# Patient Record
Sex: FEMALE | URBAN - METROPOLITAN AREA
[De-identification: names, ages, dates, MRNs, and addresses within clinical notes are randomized per-mention and may not be internally consistent; named-entity substitution may affect disease eponyms.]

---

## 2018-11-15 ENCOUNTER — APPOINTMENT (RX ONLY)
Dept: URBAN - METROPOLITAN AREA CLINIC 26 | Facility: CLINIC | Age: 53
Setting detail: DERMATOLOGY
End: 2018-11-15

## 2018-11-15 DIAGNOSIS — L81.4 OTHER MELANIN HYPERPIGMENTATION: ICD-10-CM

## 2018-11-15 DIAGNOSIS — L91.8 OTHER HYPERTROPHIC DISORDERS OF THE SKIN: ICD-10-CM

## 2018-11-15 DIAGNOSIS — D18.0 HEMANGIOMA: ICD-10-CM

## 2018-11-15 DIAGNOSIS — D22 MELANOCYTIC NEVI: ICD-10-CM

## 2018-11-15 PROBLEM — D18.01 HEMANGIOMA OF SKIN AND SUBCUTANEOUS TISSUE: Status: ACTIVE | Noted: 2018-11-15

## 2018-11-15 PROBLEM — D22.62 MELANOCYTIC NEVI OF LEFT UPPER LIMB, INCLUDING SHOULDER: Status: ACTIVE | Noted: 2018-11-15

## 2018-11-15 PROBLEM — D22.61 MELANOCYTIC NEVI OF RIGHT UPPER LIMB, INCLUDING SHOULDER: Status: ACTIVE | Noted: 2018-11-15

## 2018-11-15 PROCEDURE — 17110 DESTRUCTION B9 LES UP TO 14: CPT

## 2018-11-15 PROCEDURE — ? BENIGN DESTRUCTION

## 2018-11-15 PROCEDURE — ? SUNSCREEN RECOMMENDATIONS

## 2018-11-15 PROCEDURE — 99214 OFFICE O/P EST MOD 30 MIN: CPT | Mod: 25

## 2018-11-15 PROCEDURE — ? COUNSELING

## 2018-11-15 ASSESSMENT — LOCATION DETAILED DESCRIPTION DERM
LOCATION DETAILED: LEFT PROXIMAL POSTERIOR UPPER ARM
LOCATION DETAILED: RIGHT MID TEMPLE
LOCATION DETAILED: RIGHT AXILLARY VAULT
LOCATION DETAILED: RIGHT CENTRAL FRONTAL SCALP
LOCATION DETAILED: RIGHT PROXIMAL LATERAL POSTERIOR UPPER ARM
LOCATION DETAILED: LEFT POSTERIOR SHOULDER
LOCATION DETAILED: RIGHT POSTERIOR SHOULDER

## 2018-11-15 ASSESSMENT — LOCATION SIMPLE DESCRIPTION DERM
LOCATION SIMPLE: RIGHT AXILLARY VAULT
LOCATION SIMPLE: LEFT SHOULDER
LOCATION SIMPLE: RIGHT TEMPLE
LOCATION SIMPLE: RIGHT POSTERIOR UPPER ARM
LOCATION SIMPLE: LEFT POSTERIOR UPPER ARM
LOCATION SIMPLE: RIGHT SCALP
LOCATION SIMPLE: RIGHT SHOULDER

## 2018-11-15 ASSESSMENT — LOCATION ZONE DERM
LOCATION ZONE: SCALP
LOCATION ZONE: AXILLAE
LOCATION ZONE: FACE
LOCATION ZONE: ARM

## 2018-11-15 NOTE — PROCEDURE: BENIGN DESTRUCTION
Bill Insurance (You Assume Risk Of Denial Or Audit By Selecting Yes): Yes
Detail Level: Detailed
Consent: The patient's consent was obtained including but not limited to risks of crusting, scabbing, blistering, scarring, darker or lighter pigmentary change, recurrence, incomplete removal and infection.
Anesthesia Volume In Cc: 0.5
Post-Care Instructions: I reviewed with the patient in detail post-care instructions. Patient is to wear sunprotection, and avoid picking at any of the treated lesions. Pt may apply Vaseline to crusted or scabbing areas.
Medical Necessity Clause: This procedure was medically necessary because the lesions that were treated were:
Medical Necessity Information: It is in your best interest to select a reason for this procedure from the list below. All of these items fulfill various CMS LCD requirements except the new and changing color options.
Add 52 Modifier (Optional): no

## 2019-11-18 ENCOUNTER — APPOINTMENT (RX ONLY)
Dept: URBAN - METROPOLITAN AREA CLINIC 31 | Facility: CLINIC | Age: 54
Setting detail: DERMATOLOGY
End: 2019-11-18

## 2019-11-18 DIAGNOSIS — D22 MELANOCYTIC NEVI: ICD-10-CM

## 2019-11-18 DIAGNOSIS — Z00.00 ENCOUNTER FOR GENERAL ADULT MEDICAL EXAMINATION WITHOUT ABNORMAL FINDINGS: ICD-10-CM

## 2019-11-18 DIAGNOSIS — D18.0 HEMANGIOMA: ICD-10-CM

## 2019-11-18 DIAGNOSIS — L81.4 OTHER MELANIN HYPERPIGMENTATION: ICD-10-CM

## 2019-11-18 PROBLEM — Z71.1 PERSON WITH FEARED HEALTH COMPLAINT IN WHOM NO DIAGNOSIS IS MADE: Status: ACTIVE | Noted: 2019-11-18

## 2019-11-18 PROBLEM — D18.01 HEMANGIOMA OF SKIN AND SUBCUTANEOUS TISSUE: Status: ACTIVE | Noted: 2019-11-18

## 2019-11-18 PROBLEM — D22.5 MELANOCYTIC NEVI OF TRUNK: Status: ACTIVE | Noted: 2019-11-18

## 2019-11-18 PROCEDURE — ? FULL BODY SKIN EXAM

## 2019-11-18 PROCEDURE — 99213 OFFICE O/P EST LOW 20 MIN: CPT

## 2019-11-18 PROCEDURE — ? SUNSCREEN RECOMMENDATIONS

## 2019-11-18 PROCEDURE — ? COUNSELING

## 2019-11-18 ASSESSMENT — LOCATION ZONE DERM
LOCATION ZONE: TRUNK
LOCATION ZONE: LEG
LOCATION ZONE: ARM

## 2019-11-18 ASSESSMENT — LOCATION SIMPLE DESCRIPTION DERM
LOCATION SIMPLE: RIGHT SHOULDER
LOCATION SIMPLE: LEFT UPPER BACK
LOCATION SIMPLE: RIGHT THIGH
LOCATION SIMPLE: UPPER BACK
LOCATION SIMPLE: LEFT SHOULDER

## 2019-11-18 ASSESSMENT — LOCATION DETAILED DESCRIPTION DERM
LOCATION DETAILED: RIGHT POSTERIOR SHOULDER
LOCATION DETAILED: SUPERIOR THORACIC SPINE
LOCATION DETAILED: LEFT SUPERIOR UPPER BACK
LOCATION DETAILED: RIGHT ANTERIOR PROXIMAL THIGH
LOCATION DETAILED: LEFT POSTERIOR SHOULDER

## 2021-04-20 ENCOUNTER — APPOINTMENT (RX ONLY)
Dept: URBAN - METROPOLITAN AREA CLINIC 26 | Facility: CLINIC | Age: 56
Setting detail: DERMATOLOGY
End: 2021-04-20

## 2021-04-20 DIAGNOSIS — D18.0 HEMANGIOMA: ICD-10-CM

## 2021-04-20 DIAGNOSIS — D22 MELANOCYTIC NEVI: ICD-10-CM

## 2021-04-20 DIAGNOSIS — L81.4 OTHER MELANIN HYPERPIGMENTATION: ICD-10-CM

## 2021-04-20 PROBLEM — D18.01 HEMANGIOMA OF SKIN AND SUBCUTANEOUS TISSUE: Status: ACTIVE | Noted: 2021-04-20

## 2021-04-20 PROBLEM — D22.72 MELANOCYTIC NEVI OF LEFT LOWER LIMB, INCLUDING HIP: Status: ACTIVE | Noted: 2021-04-20

## 2021-04-20 PROBLEM — D22.61 MELANOCYTIC NEVI OF RIGHT UPPER LIMB, INCLUDING SHOULDER: Status: ACTIVE | Noted: 2021-04-20

## 2021-04-20 PROCEDURE — ? OBSERVATION AND MEASURE

## 2021-04-20 PROCEDURE — ? FULL BODY SKIN EXAM

## 2021-04-20 PROCEDURE — 99213 OFFICE O/P EST LOW 20 MIN: CPT

## 2021-04-20 PROCEDURE — ? COUNSELING

## 2021-04-20 PROCEDURE — ? ADDITIONAL NOTES

## 2021-04-20 PROCEDURE — ? TREATMENT REGIMEN

## 2021-04-20 ASSESSMENT — LOCATION SIMPLE DESCRIPTION DERM
LOCATION SIMPLE: LEFT PLANTAR SURFACE
LOCATION SIMPLE: ABDOMEN
LOCATION SIMPLE: UPPER BACK
LOCATION SIMPLE: RIGHT SHOULDER

## 2021-04-20 ASSESSMENT — LOCATION DETAILED DESCRIPTION DERM
LOCATION DETAILED: INFERIOR THORACIC SPINE
LOCATION DETAILED: PERIUMBILICAL SKIN
LOCATION DETAILED: RIGHT POSTERIOR SHOULDER
LOCATION DETAILED: LEFT MEDIAL PLANTAR MIDFOOT

## 2021-04-20 ASSESSMENT — LOCATION ZONE DERM
LOCATION ZONE: FEET
LOCATION ZONE: ARM
LOCATION ZONE: TRUNK

## 2022-05-11 ENCOUNTER — APPOINTMENT (RX ONLY)
Dept: URBAN - METROPOLITAN AREA CLINIC 31 | Facility: CLINIC | Age: 57
Setting detail: DERMATOLOGY
End: 2022-05-11

## 2022-05-11 DIAGNOSIS — D22 MELANOCYTIC NEVI: ICD-10-CM

## 2022-05-11 DIAGNOSIS — D18.0 HEMANGIOMA: ICD-10-CM

## 2022-05-11 DIAGNOSIS — L81.4 OTHER MELANIN HYPERPIGMENTATION: ICD-10-CM

## 2022-05-11 PROBLEM — D22.72 MELANOCYTIC NEVI OF LEFT LOWER LIMB, INCLUDING HIP: Status: ACTIVE | Noted: 2022-05-11

## 2022-05-11 PROBLEM — D18.01 HEMANGIOMA OF SKIN AND SUBCUTANEOUS TISSUE: Status: ACTIVE | Noted: 2022-05-11

## 2022-05-11 PROBLEM — D22.5 MELANOCYTIC NEVI OF TRUNK: Status: ACTIVE | Noted: 2022-05-11

## 2022-05-11 PROCEDURE — ? OBSERVATION AND MEASURE

## 2022-05-11 PROCEDURE — ? COUNSELING

## 2022-05-11 PROCEDURE — ? FULL BODY SKIN EXAM

## 2022-05-11 PROCEDURE — 99213 OFFICE O/P EST LOW 20 MIN: CPT

## 2022-05-11 ASSESSMENT — LOCATION ZONE DERM
LOCATION ZONE: FEET
LOCATION ZONE: TRUNK

## 2022-05-11 ASSESSMENT — LOCATION DETAILED DESCRIPTION DERM
LOCATION DETAILED: SUPERIOR THORACIC SPINE
LOCATION DETAILED: LEFT MEDIAL PLANTAR MIDFOOT

## 2022-05-11 ASSESSMENT — LOCATION SIMPLE DESCRIPTION DERM
LOCATION SIMPLE: UPPER BACK
LOCATION SIMPLE: LEFT PLANTAR SURFACE

## 2023-05-17 ENCOUNTER — APPOINTMENT (RX ONLY)
Dept: URBAN - METROPOLITAN AREA CLINIC 31 | Facility: CLINIC | Age: 58
Setting detail: DERMATOLOGY
End: 2023-05-17

## 2023-05-17 DIAGNOSIS — L81.4 OTHER MELANIN HYPERPIGMENTATION: ICD-10-CM

## 2023-05-17 DIAGNOSIS — D22 MELANOCYTIC NEVI: ICD-10-CM | Status: STABLE

## 2023-05-17 DIAGNOSIS — D18.0 HEMANGIOMA: ICD-10-CM

## 2023-05-17 DIAGNOSIS — L82.1 OTHER SEBORRHEIC KERATOSIS: ICD-10-CM

## 2023-05-17 DIAGNOSIS — D22 MELANOCYTIC NEVI: ICD-10-CM

## 2023-05-17 PROBLEM — D18.01 HEMANGIOMA OF SKIN AND SUBCUTANEOUS TISSUE: Status: ACTIVE | Noted: 2023-05-17

## 2023-05-17 PROBLEM — D22.5 MELANOCYTIC NEVI OF TRUNK: Status: ACTIVE | Noted: 2023-05-17

## 2023-05-17 PROBLEM — D22.72 MELANOCYTIC NEVI OF LEFT LOWER LIMB, INCLUDING HIP: Status: ACTIVE | Noted: 2023-05-17

## 2023-05-17 PROCEDURE — 99213 OFFICE O/P EST LOW 20 MIN: CPT

## 2023-05-17 PROCEDURE — ? COUNSELING

## 2023-05-17 PROCEDURE — ? FULL BODY SKIN EXAM

## 2023-05-17 PROCEDURE — ? OBSERVATION AND MEASURE

## 2023-05-17 PROCEDURE — ? TREATMENT REGIMEN

## 2023-05-17 ASSESSMENT — LOCATION DETAILED DESCRIPTION DERM
LOCATION DETAILED: INFERIOR THORACIC SPINE
LOCATION DETAILED: SUPERIOR THORACIC SPINE
LOCATION DETAILED: LEFT MEDIAL PLANTAR MIDFOOT

## 2023-05-17 ASSESSMENT — LOCATION ZONE DERM
LOCATION ZONE: TRUNK
LOCATION ZONE: FEET

## 2023-05-17 ASSESSMENT — LOCATION SIMPLE DESCRIPTION DERM
LOCATION SIMPLE: UPPER BACK
LOCATION SIMPLE: LEFT PLANTAR SURFACE

## 2023-10-17 ENCOUNTER — APPOINTMENT (EMERGENCY)
Dept: RADIOLOGY | Facility: HOSPITAL | Age: 58
End: 2023-10-17
Payer: COMMERCIAL

## 2023-10-17 ENCOUNTER — HOSPITAL ENCOUNTER (EMERGENCY)
Facility: HOSPITAL | Age: 58
Discharge: HOME | End: 2023-10-17
Attending: EMERGENCY MEDICINE
Payer: COMMERCIAL

## 2023-10-17 VITALS
DIASTOLIC BLOOD PRESSURE: 79 MMHG | TEMPERATURE: 98 F | OXYGEN SATURATION: 98 % | SYSTOLIC BLOOD PRESSURE: 135 MMHG | HEART RATE: 68 BPM | RESPIRATION RATE: 16 BRPM

## 2023-10-17 DIAGNOSIS — R00.2 PALPITATIONS: ICD-10-CM

## 2023-10-17 DIAGNOSIS — R07.9 ACUTE CHEST PAIN: Primary | ICD-10-CM

## 2023-10-17 LAB
ALBUMIN SERPL-MCNC: 4.7 G/DL (ref 3.5–5.7)
ALP SERPL-CCNC: 97 IU/L (ref 34–125)
ALT SERPL-CCNC: 30 IU/L (ref 7–52)
ANION GAP SERPL CALC-SCNC: 6 MEQ/L (ref 3–15)
AST SERPL-CCNC: 27 IU/L (ref 13–39)
BASOPHILS # BLD: 0.05 K/UL (ref 0.01–0.1)
BASOPHILS NFR BLD: 0.6 %
BILIRUB SERPL-MCNC: 0.5 MG/DL (ref 0.3–1.2)
BUN SERPL-MCNC: 10 MG/DL (ref 7–25)
CALCIUM SERPL-MCNC: 10.1 MG/DL (ref 8.6–10.3)
CHLORIDE SERPL-SCNC: 103 MEQ/L (ref 98–107)
CO2 SERPL-SCNC: 29 MEQ/L (ref 21–31)
CREAT SERPL-MCNC: 0.6 MG/DL (ref 0.6–1.2)
D DIMER PPP IA.FEU-MCNC: 0.39 UG/ML FEU (ref 0–0.5)
DIFFERENTIAL METHOD BLD: ABNORMAL
EOSINOPHIL # BLD: 0.08 K/UL (ref 0.04–0.36)
EOSINOPHIL NFR BLD: 1 %
ERYTHROCYTE [DISTWIDTH] IN BLOOD BY AUTOMATED COUNT: 12.8 % (ref 11.7–14.4)
GFR SERPL CREATININE-BSD FRML MDRD: >60 ML/MIN/1.73M*2
GLUCOSE SERPL-MCNC: 102 MG/DL (ref 70–99)
HCT VFR BLDCO AUTO: 41.6 % (ref 35–45)
HGB BLD-MCNC: 13.8 G/DL (ref 11.8–15.7)
IMM GRANULOCYTES # BLD AUTO: 0.04 K/UL (ref 0–0.08)
IMM GRANULOCYTES NFR BLD AUTO: 0.5 %
LYMPHOCYTES # BLD: 1.69 K/UL (ref 1.2–3.5)
LYMPHOCYTES NFR BLD: 20.7 %
MAGNESIUM SERPL-MCNC: 2.2 MG/DL (ref 1.8–2.5)
MCH RBC QN AUTO: 30.9 PG (ref 28–33.2)
MCHC RBC AUTO-ENTMCNC: 33.2 G/DL (ref 32.2–35.5)
MCV RBC AUTO: 93.3 FL (ref 83–98)
MONOCYTES # BLD: 0.6 K/UL (ref 0.28–0.8)
MONOCYTES NFR BLD: 7.4 %
NEUTROPHILS # BLD: 5.69 K/UL (ref 1.7–7)
NEUTS SEG NFR BLD: 69.8 %
NRBC BLD-RTO: 0 %
PDW BLD AUTO: 8.8 FL (ref 9.4–12.3)
PLATELET # BLD AUTO: 278 K/UL (ref 150–369)
POTASSIUM SERPL-SCNC: 4.7 MEQ/L (ref 3.5–5.1)
PROT SERPL-MCNC: 8 G/DL (ref 6–8.2)
RBC # BLD AUTO: 4.46 M/UL (ref 3.93–5.22)
SODIUM SERPL-SCNC: 138 MEQ/L (ref 136–145)
TROPONIN I SERPL HS-MCNC: <2 PG/ML
TROPONIN I SERPL HS-MCNC: <2 PG/ML
TSH SERPL DL<=0.05 MIU/L-ACNC: 2.45 MIU/L (ref 0.34–5.6)
WBC # BLD AUTO: 8.15 K/UL (ref 3.8–10.5)

## 2023-10-17 PROCEDURE — 96361 HYDRATE IV INFUSION ADD-ON: CPT

## 2023-10-17 PROCEDURE — 99284 EMERGENCY DEPT VISIT MOD MDM: CPT | Mod: 25

## 2023-10-17 PROCEDURE — 3E0337Z INTRODUCTION OF ELECTROLYTIC AND WATER BALANCE SUBSTANCE INTO PERIPHERAL VEIN, PERCUTANEOUS APPROACH: ICD-10-PCS | Performed by: EMERGENCY MEDICINE

## 2023-10-17 PROCEDURE — 93005 ELECTROCARDIOGRAM TRACING: CPT

## 2023-10-17 PROCEDURE — 96360 HYDRATION IV INFUSION INIT: CPT

## 2023-10-17 PROCEDURE — 80053 COMPREHEN METABOLIC PANEL: CPT | Performed by: EMERGENCY MEDICINE

## 2023-10-17 PROCEDURE — 25800000 HC PHARMACY IV SOLUTIONS: Performed by: PHYSICIAN ASSISTANT

## 2023-10-17 PROCEDURE — 85379 FIBRIN DEGRADATION QUANT: CPT | Performed by: PHYSICIAN ASSISTANT

## 2023-10-17 PROCEDURE — 84484 ASSAY OF TROPONIN QUANT: CPT | Mod: 91 | Performed by: EMERGENCY MEDICINE

## 2023-10-17 PROCEDURE — 83735 ASSAY OF MAGNESIUM: CPT | Performed by: PHYSICIAN ASSISTANT

## 2023-10-17 PROCEDURE — 84484 ASSAY OF TROPONIN QUANT: CPT

## 2023-10-17 PROCEDURE — 36415 COLL VENOUS BLD VENIPUNCTURE: CPT

## 2023-10-17 PROCEDURE — 85025 COMPLETE CBC W/AUTO DIFF WBC: CPT

## 2023-10-17 PROCEDURE — 93005 ELECTROCARDIOGRAM TRACING: CPT | Performed by: EMERGENCY MEDICINE

## 2023-10-17 PROCEDURE — 84484 ASSAY OF TROPONIN QUANT: CPT | Performed by: EMERGENCY MEDICINE

## 2023-10-17 PROCEDURE — 84443 ASSAY THYROID STIM HORMONE: CPT | Performed by: PHYSICIAN ASSISTANT

## 2023-10-17 PROCEDURE — 80053 COMPREHEN METABOLIC PANEL: CPT

## 2023-10-17 PROCEDURE — 85025 COMPLETE CBC W/AUTO DIFF WBC: CPT | Performed by: EMERGENCY MEDICINE

## 2023-10-17 PROCEDURE — 71046 X-RAY EXAM CHEST 2 VIEWS: CPT

## 2023-10-17 RX ORDER — TRAZODONE HYDROCHLORIDE 50 MG/1
50 TABLET ORAL
COMMUNITY
Start: 2023-10-02 | End: 2024-01-15 | Stop reason: SDUPTHER

## 2023-10-17 RX ADMIN — SODIUM CHLORIDE 500 ML: 9 INJECTION, SOLUTION INTRAVENOUS at 17:22

## 2023-10-17 ASSESSMENT — ENCOUNTER SYMPTOMS
NAUSEA: 0
HEMATURIA: 0
BLOOD IN STOOL: 0
DIZZINESS: 0
SHORTNESS OF BREATH: 0
ARTHRALGIAS: 1
BACK PAIN: 0
APPETITE CHANGE: 0
SORE THROAT: 0
VOMITING: 0
FEVER: 0
PALPITATIONS: 1
DIARRHEA: 0
CHILLS: 0
WEAKNESS: 0
COUGH: 0
NUMBNESS: 0
HEADACHES: 0
ABDOMINAL PAIN: 0

## 2023-10-17 NOTE — ED PROVIDER NOTES
Emergency Medicine Note  HPI   HISTORY OF PRESENT ILLNESS     57-year-old female with history of insomnia presents to the ED for evaluation of chest pain and palpitations.  Patient reports 3 days ago at night she developed sternal chest discomfort sharp in nature was 4-10 in severity with pounding palpitations that lasted for 1 to 2 hours.  During that time span she used her 's cardia program and her heart rate was in the low 100s sinus tachycardia.  She reports feeling fine over the last couple days, was able to workout without difficulties on a treadmill.  Around 12 PM this afternoon she started with pounding palpitations again as well as the sternal chest discomfort that then radiated to her left shoulder.  She reports symptoms were intermittent for about 2 hours and last occurred at 2 PM.  She reports during that time when she was belching it would help with symptoms.  She went to an urgent care and was sent here for further evaluation.  She last flew to [place] 2 weeks ago.  She is in a first appointment with a cardiologist in December Dr. Oneill.  She has never seen a cardiologist previously.  No personal history of CAD or family history of CAD less than 65 years old.  Denies tobacco use, drug use, leg swelling or calf pain, hemoptysis, birth control pill use, history of blood clots, nausea, vomiting, diarrhea, black or bloody bowel movements, cough, fever, history of arrhythmias, history of thyroid problems.  She reports having 2 glasses of wine almost nightly.  Denies increase in caffeine intake or recent over-the-counter decongestant use.  States she has been eating and drinking normally.  Does not have any symptoms currently in ER. She is an RN.      History provided by:  Patient        Patient History   PAST HISTORY     Reviewed from Nursing Triage:       History reviewed. No pertinent past medical history.    No past surgical history on file.    History reviewed. No pertinent family history.            Review of Systems   REVIEW OF SYSTEMS     Review of Systems   Constitutional: Negative for appetite change, chills, diaphoresis and fever.   HENT: Negative for sore throat.    Eyes: Negative for visual disturbance.   Respiratory: Negative for cough and shortness of breath.    Cardiovascular: Positive for chest pain and palpitations. Negative for leg swelling.   Gastrointestinal: Negative for abdominal pain, blood in stool, diarrhea, nausea and vomiting.   Genitourinary: Negative for hematuria.   Musculoskeletal: Positive for arthralgias (left shoulder). Negative for back pain.   Skin: Negative for rash.   Neurological: Negative for dizziness, syncope, weakness, numbness and headaches.         VITALS     ED Vitals    Date/Time Temp Pulse Resp BP SpO2 [facility]   10/17/23 1907 36.7 °C (98 °F) 68 16 135/79 98 % GT   10/17/23 1643 -- 67 16 158/72 98 % GT   10/17/23 1526 36.7 °C (98 °F) 78 18 157/72 98 % GC        Pulse Ox %: 98 % (10/17/23 1643)  Pulse Ox Interpretation: Normal (10/17/23 1643)  Heart Rate: 67 (10/17/23 1643)  Rhythm Strip Interpretation: Normal Sinus Rhythm (10/17/23 1643)     Physical Exam   PHYSICAL EXAM     Physical Exam  Vitals and nursing note reviewed.   Constitutional:       General: She is not in acute distress.     Appearance: She is not ill-appearing.   HENT:      Head: Normocephalic.      Mouth/Throat:      Mouth: Mucous membranes are moist.   Eyes:      Conjunctiva/sclera: Conjunctivae normal.   Cardiovascular:      Rate and Rhythm: Normal rate and regular rhythm.      Pulses:           Radial pulses are 2+ on the right side and 2+ on the left side.        Posterior tibial pulses are 2+ on the right side and 2+ on the left side.      Heart sounds: No murmur heard.  Pulmonary:      Effort: Pulmonary effort is normal. No respiratory distress.      Breath sounds: Normal breath sounds.   Chest:      Chest wall: No tenderness.   Abdominal:      Palpations: Abdomen is soft.      Tenderness: There  is no abdominal tenderness. There is no guarding or rebound.   Musculoskeletal:      Cervical back: Neck supple.      Right lower leg: No edema.      Left lower leg: No edema.      Comments: No calf TTP BL   Skin:     General: Skin is warm and dry.   Neurological:      Mental Status: She is alert.   Psychiatric:         Mood and Affect: Mood normal.           PROCEDURES     Procedures     DATA     Results     Procedure Component Value Units Date/Time    Magnesium [[id number]]  (Normal) Collected: 10/17/23 1529    Specimen: Blood, Venous Updated: 10/17/23 1818     Magnesium 2.2 mg/dL      Comment: Moderate hemolysis, results may be affected.        TSH w reflex FT4 [[id number]]  (Normal) Collected: 10/17/23 1529    Specimen: Blood, Venous Updated: 10/17/23 1749     TSH 2.45 mIU/L     D-dimer, quantitative [[id number]]  (Normal) Collected: 10/17/23 1643    Specimen: Blood, Venous Updated: 10/17/23 1700     D-Dimer, Quant 0.39 ug/mL FEU      Comment: Suggested Age Related Reference Range: 0.00 - 0.57  The D-Dimer assay can be used as an aid in the diagnosis of DVT or PE. The test can not be used by itself to exclude DVT or PE. When used as a diagnostic aid, the cutoff value is the same as the reference range: <0.5 ug/ml FEU.       HS Troponin I (with 2 hour reflex) [[id number]]  (Normal) Collected: 10/17/23 1529    Specimen: Blood, Venous Updated: 10/17/23 1606     High Sens Troponin I <2.0 pg/mL     Comprehensive metabolic panel [[id number]]  (Abnormal) Collected: 10/17/23 1529    Specimen: Blood, Venous Updated: 10/17/23 1600     Sodium 138 mEQ/L      Potassium 4.7 mEQ/L      Comment: Results obtained on plasma. Plasma Potassium values may be up to 0.4 mEQ/L less than serum values. The differences may be greater for patients with high platelet or white cell counts.        Chloride 103 mEQ/L      CO2 29 mEQ/L      BUN 10 mg/dL      Creatinine 0.6 mg/dL      Glucose 102 mg/dL      Calcium 10.1 mg/dL      AST (SGOT) 27 IU/L       ALT (SGPT) 30 IU/L      Alkaline Phosphatase 97 IU/L      Total Protein 8.0 g/dL      Comment: Test performed on plasma which typically contains approximately 0.4 g/dL more protein than serum.        Albumin 4.7 g/dL      Bilirubin, Total 0.5 mg/dL      eGFR >60.0 mL/min/1.73m*2      Anion Gap 6 mEQ/L     CBC and differential [[id number]]  (Abnormal) Collected: 10/17/23 1529    Specimen: Blood, Venous Updated: 10/17/23 1541     WBC 8.15 K/uL      RBC 4.46 M/uL      Hemoglobin 13.8 g/dL      Hematocrit 41.6 %      MCV 93.3 fL      MCH 30.9 pg      MCHC 33.2 g/dL      RDW 12.8 %      Platelets 278 K/uL      MPV 8.8 fL      Differential Type Auto     nRBC 0.0 %      Immature Granulocytes 0.5 %      Neutrophils 69.8 %      Lymphocytes 20.7 %      Monocytes 7.4 %      Eosinophils 1.0 %      Basophils 0.6 %      Immature Granulocytes, Absolute 0.04 K/uL      Neutrophils, Absolute 5.69 K/uL      Lymphocytes, Absolute 1.69 K/uL      Monocytes, Absolute 0.60 K/uL      Eosinophils, Absolute 0.08 K/uL      Basophils, Absolute 0.05 K/uL           Imaging Results          X-RAY CHEST 2 VIEWS (Final result)  Result time 10/17/23 17:12:29    Final result                 Impression:    IMPRESSION:  No acute cardiopulmonary disease    COMMENT:  Two view chest was performed.  The lungs are clear. There is no  evidence of consolidation or pleural effusion. The heart and mediastinal  structures are normal in size and contour.               Narrative:    CLINICAL HISTORY:       Palpitations                                ECG 12 lead          Scoring tools                                  ED Course & MDM   MDM / ED COURSE / CLINICAL IMPRESSION / DISPO     Medical Decision Making  ddx considered but not limited to PE, ACS, TAD, GERD, thyroid disorder, arrhythmia, electrolyte disorder, anemia    Amount and/or Complexity of Data Reviewed  External Data Reviewed: labs and notes.  Labs: ordered. Decision-making details documented in ED  Course.  Radiology: ordered. Decision-making details documented in ED Course.  ECG/medicine tests: ordered. Decision-making details documented in ED Course.          ED Course as of 10/20/23 7448   Tue Oct 17, 2023   1625 ED room currently being cleaned [TC]   1645 EKG interpretation 68 normal sinus rhythm, normal axis, no ST elevation, normal QT/QTc reviewed with Dr. Jerome [TC]   1709 D-Dimer, Quant: 0.39 [TC]   1709 High Sens Troponin I: <2.0  Will repeat   [TC]   1711 No significant electrolyte abnormality.  Stable hemoglobin, normal white blood cell count.  Normal LFTs and bilirubin. [TC]   1724 X-RAY CHEST 2 VIEWS  IMPRESSION:  No acute cardiopulmonary disease [TC]   1844 High Sens Troponin I: <2.0  Delta troponin reassuring and undetectable both values [PT]   1848 Work-up unremarkable.  Will have patient follow-up with cardiology for further outpatient evaluation and discuss Holter monitoring.  Strict return precautions given. [TC]      ED Course User Index  [PT] Imtiaz Jerome DO  [TC] Jerrica Hopper PA C     Clinical Impression      Acute chest pain   Palpitations     _________________     ED Disposition   Discharge                   Jerrica Hopper PA C  10/20/23 0257

## 2023-10-17 NOTE — ED ATTESTATION NOTE
I have personally seen and examined Ching Gaytan.  I was involved in the care and medical decision making for this patient.    I reviewed and agree with physician assistant / nurse practitioners assessment and plan of care; any exceptions are documented below.      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  Supposed to see her new primary doctor on October 30.  She is a history of high cholesterol.  She had an episode of chest pain and palpitations on Saturday, another 1 today.  Noted her blood pressure was high today as well.  She did fly back from [place] recently.  She went to an urgent care was referred to the emergency department.    Focused Physical Exam:  Physical Exam  Vital signs noted, mildly hypertensive no acute distress  Lungs clear, heart regular    No calf tenderness noted        Assessment / Plan / MDM:  MDM  EKG confirms a sinus rhythm 60 bpm, no STEMI, no ectopy, reassuring QTc  Plan telemetry, pulse oximetry, laboratory studies including troponin, D-dimer, chest x-ray and reevaluation.    I was physically present for the key/critical portions of the following procedures:  Procedures           Imtiaz Jerome DO  10/17/23 1948

## 2023-10-18 LAB
ATRIAL RATE: 68
P AXIS: -1
PR INTERVAL: 140
QRS DURATION: 80
QT INTERVAL: 370
QTC CALCULATION(BAZETT): 393
R AXIS: 13
T WAVE AXIS: 27
VENTRICULAR RATE: 68

## 2023-10-20 ASSESSMENT — ENCOUNTER SYMPTOMS: DIAPHORESIS: 0

## 2023-10-26 ENCOUNTER — OFFICE VISIT (OUTPATIENT)
Dept: FAMILY MEDICINE | Facility: CLINIC | Age: 58
End: 2023-10-26
Payer: COMMERCIAL

## 2023-10-26 VITALS
SYSTOLIC BLOOD PRESSURE: 128 MMHG | WEIGHT: 184 LBS | OXYGEN SATURATION: 96 % | BODY MASS INDEX: 32.6 KG/M2 | HEIGHT: 63 IN | TEMPERATURE: 97.1 F | DIASTOLIC BLOOD PRESSURE: 80 MMHG | HEART RATE: 81 BPM

## 2023-10-26 DIAGNOSIS — E78.00 ELEVATED LDL CHOLESTEROL LEVEL: ICD-10-CM

## 2023-10-26 DIAGNOSIS — K42.9 UMBILICAL HERNIA WITHOUT OBSTRUCTION AND WITHOUT GANGRENE: ICD-10-CM

## 2023-10-26 DIAGNOSIS — Z23 VACCINE FOR DIPHTHERIA-TETANUS-PERTUSSIS, COMBINED: ICD-10-CM

## 2023-10-26 DIAGNOSIS — R00.2 PALPITATIONS: ICD-10-CM

## 2023-10-26 DIAGNOSIS — E55.9 VITAMIN D DEFICIENCY: ICD-10-CM

## 2023-10-26 DIAGNOSIS — R07.89 ATYPICAL CHEST PAIN: Primary | ICD-10-CM

## 2023-10-26 DIAGNOSIS — K21.9 GASTROESOPHAGEAL REFLUX DISEASE WITHOUT ESOPHAGITIS: ICD-10-CM

## 2023-10-26 DIAGNOSIS — F51.01 PRIMARY INSOMNIA: ICD-10-CM

## 2023-10-26 PROBLEM — T75.3XXA MOTION SICKNESS: Status: ACTIVE | Noted: 2023-10-26

## 2023-10-26 PROBLEM — H69.90 DYSFUNCTION OF EUSTACHIAN TUBE: Status: ACTIVE | Noted: 2019-09-18

## 2023-10-26 PROBLEM — D12.6 ADENOMATOUS POLYP OF COLON: Status: ACTIVE | Noted: 2022-11-07

## 2023-10-26 PROCEDURE — 36415 COLL VENOUS BLD VENIPUNCTURE: CPT | Performed by: FAMILY MEDICINE

## 2023-10-26 PROCEDURE — 99203 OFFICE O/P NEW LOW 30 MIN: CPT | Mod: 25 | Performed by: FAMILY MEDICINE

## 2023-10-26 PROCEDURE — 3008F BODY MASS INDEX DOCD: CPT | Performed by: FAMILY MEDICINE

## 2023-10-26 PROCEDURE — 90715 TDAP VACCINE 7 YRS/> IM: CPT | Performed by: FAMILY MEDICINE

## 2023-10-26 PROCEDURE — 90471 IMMUNIZATION ADMIN: CPT | Performed by: FAMILY MEDICINE

## 2023-10-26 RX ORDER — CALCIUM CARB/VITAMIN D3/VIT K1 500-500-40
TABLET,CHEWABLE ORAL
COMMUNITY

## 2023-10-26 RX ORDER — SCOPOLAMINE 1 MG/3D
PATCH, EXTENDED RELEASE TRANSDERMAL
COMMUNITY
Start: 2023-01-04 | End: 2024-05-29 | Stop reason: SDUPTHER

## 2023-10-26 RX ORDER — CLOTRIMAZOLE AND BETAMETHASONE DIPROPIONATE 10; .64 MG/G; MG/G
CREAM TOPICAL 2 TIMES DAILY PRN
COMMUNITY
Start: 2023-02-14 | End: 2024-02-14

## 2023-10-26 RX ORDER — OMEPRAZOLE 20 MG/1
20 CAPSULE, DELAYED RELEASE ORAL
COMMUNITY

## 2023-10-26 ASSESSMENT — PATIENT HEALTH QUESTIONNAIRE - PHQ9: SUM OF ALL RESPONSES TO PHQ9 QUESTIONS 1 & 2: 0

## 2023-10-26 NOTE — PROGRESS NOTES
HPI:  Ching Gaytan is an 57 y.o. female presenting for new patient visit.    2 episodes of chest pain and palpitations in the past few weeks.   Seen at [facility] ED 10/17. Evaluation normal. Advised to see cardiology. Scheduled to see Dr. Oneill in December.  Patient resumed Prilosec and has had no further episodes of chest pain.  Still having intermittent palpitations - occur without exertion. Able to walk on treadmill for 1 hour without symptoms.  History of elevated cholesterol - not on statin. Advised to get coronary calcium CT.      Known umbilical/incisional hernia - becoming more painful.  Right rib pain - onset one month ago, no obvious injury, has improved, occurring less frequently.        Allergies:  Patient has no known allergies.    Past Medical History:   Diagnosis Date    Benign insulinoma     High cholesterol     Umbilical hernia     Vitamin D deficiency        Past Surgical History:   Procedure Laterality Date    PANCREAS SURGERY  1990    insulinoma       Social History     Tobacco Use    Smoking status: Never    Smokeless tobacco: Never   Substance Use Topics    Alcohol use: Yes     Alcohol/week: 14.0 standard drinks of alcohol     Types: 14 Glasses of wine per week       Current Outpatient Medications on File Prior to Visit   Medication Sig Dispense Refill    cholecalciferol, vitamin D3, 10 mcg (400 unit) capsule Vitamin D3 400 unit capsule   Take by oral route.      clotrimazole-betamethasone (LOTRISONE) 1-0.05 % cream Apply topically 2 times daily as needed.      omeprazole (PriLOSEC) 20 mg capsule Take 20 mg by mouth daily before breakfast.      scopolamine (TRANSDERM-SCOP) 1 mg over 3 days transdermal patch PLACE 1 PATCH ON THE SKIN EVERY 3 DAYS.      traZODone (DESYREL) 50 mg tablet Take 50 mg by mouth.      vit A,C and E-lutein-minerals 300 mcg-200 mg-27 mg-2 mg tablet Take 1 tablet by mouth daily.       No current facility-administered medications on file prior to visit.  "      Family History   Problem Relation Age of Onset    Hyperlipidemia Biological Mother     Hyperlipidemia Biological Father     Parkinsonism Biological Father     Dementia Biological Father     Lymphoma Biological Sister     Lymphoma Biological Brother        Visit Vitals  /80   Pulse 81   Temp 36.2 °C (97.1 °F) (Temporal)   Ht 1.6 m (5' 3\")   Wt 83.5 kg (184 lb)   LMP  (LMP Unknown)   SpO2 96%   BMI 32.59 kg/m²        Physical Exam  Constitutional:       General: She is not in acute distress.     Appearance: Normal appearance. She is not ill-appearing.   HENT:      Right Ear: Tympanic membrane and ear canal normal.      Left Ear: Tympanic membrane and ear canal normal.      Nose: Nose normal.      Mouth/Throat:      Pharynx: Oropharynx is clear.   Eyes:      Extraocular Movements: Extraocular movements intact.      Pupils: Pupils are equal, round, and reactive to light.   Neck:      Thyroid: No thyromegaly.   Cardiovascular:      Rate and Rhythm: Normal rate and regular rhythm.      Pulses: Normal pulses.           Dorsalis pedis pulses are 2+ on the right side and 2+ on the left side.      Heart sounds: Normal heart sounds. No murmur heard.  Pulmonary:      Effort: Pulmonary effort is normal. No respiratory distress.      Breath sounds: Normal breath sounds. No wheezing, rhonchi or rales.   Abdominal:      General: Bowel sounds are normal.      Palpations: Abdomen is soft. There is no mass.      Tenderness: There is no abdominal tenderness. There is no guarding or rebound.      Comments: Supraumbilical bulge noted with Valsalva   Musculoskeletal:         General: No swelling or tenderness. Normal range of motion.      Cervical back: Normal range of motion and neck supple.      Right lower leg: No edema.      Left lower leg: No edema.   Lymphadenopathy:      Cervical: No cervical adenopathy.   Skin:     Findings: No lesion or rash.   Neurological:      General: No focal deficit present.      Mental " Status: She is alert.   Psychiatric:         Mood and Affect: Mood normal.           A/P  1. Atypical chest pain    - Ambulatory referral to Cardiology; Future    2. Palpitations    - Ambulatory referral to Cardiology; Future    3. Vitamin D deficiency    - Vitamin D 25 hydroxy    4. Primary insomnia  Controlled with Trazodone    5. Umbilical hernia without obstruction and without gangrene  Vs incisional hernia  - CT ABDOMEN PELVIS WITH AND WITHOUT IV CONTRAST; Future    6. Gastroesophageal reflux disease without esophagitis  controlled  - omeprazole (PriLOSEC) 20 mg capsule; Take 20 mg by mouth daily before breakfast.    7. Elevated LDL cholesterol level    - Lipid panel  - CT HEART CORONARY ARTERY CALCIUM SCORE WITHOUT IV CONTRAST; Future    8. Vaccine for diphtheria-tetanus-pertussis, combined    - Tdap vaccine greater than or equal to 6yo IM      PAP and mammogram UTD.    Next appointment recommended:  TBD      The patient (parent) indicates an understanding of the events of today's medical evaluation and agrees to the plan of care.    I have asked the patient (parent) to be on the alert for new or worsening symptoms and to call the office directly or proceed to the nearest ER if such should occur.    Total time spent on day on encounter 30 minutes.

## 2023-10-27 LAB
25(OH)D3+25(OH)D2 SERPL-MCNC: 55.3 NG/ML (ref 30–100)
CHOLEST SERPL-MCNC: 274 MG/DL (ref 100–199)
HDLC SERPL-MCNC: 101 MG/DL
LDLC SERPL CALC-MCNC: 164 MG/DL (ref 0–99)
TRIGL SERPL-MCNC: 60 MG/DL (ref 0–149)
VLDLC SERPL CALC-MCNC: 9 MG/DL (ref 5–40)

## 2023-11-08 DIAGNOSIS — K42.9 UMBILICAL HERNIA WITHOUT OBSTRUCTION AND WITHOUT GANGRENE: Primary | ICD-10-CM

## 2023-11-16 ENCOUNTER — HOSPITAL ENCOUNTER (OUTPATIENT)
Dept: RADIOLOGY | Age: 58
Discharge: HOME | End: 2023-11-16
Attending: FAMILY MEDICINE

## 2023-11-16 ENCOUNTER — HOSPITAL ENCOUNTER (OUTPATIENT)
Dept: RADIOLOGY | Age: 58
Discharge: HOME | End: 2023-11-16
Attending: FAMILY MEDICINE
Payer: COMMERCIAL

## 2023-11-16 DIAGNOSIS — E78.00 ELEVATED LDL CHOLESTEROL LEVEL: ICD-10-CM

## 2023-11-16 DIAGNOSIS — K42.9 UMBILICAL HERNIA WITHOUT OBSTRUCTION AND WITHOUT GANGRENE: ICD-10-CM

## 2023-11-16 PROCEDURE — 74177 CT ABD & PELVIS W/CONTRAST: CPT

## 2023-11-16 PROCEDURE — 75571 CT HRT W/O DYE W/CA TEST: CPT | Mod: MG

## 2023-11-16 PROCEDURE — 63600105 HC IODINE BASED CONTRAST: Mod: JZ

## 2023-11-16 PROCEDURE — 25500000 HC DRUGS/INCIDENT RAD

## 2023-11-16 RX ORDER — IOPAMIDOL 755 MG/ML
100 INJECTION, SOLUTION INTRAVASCULAR
Status: COMPLETED | OUTPATIENT
Start: 2023-11-16 | End: 2023-11-16

## 2023-11-16 RX ADMIN — IOPAMIDOL 100 ML: 755 INJECTION, SOLUTION INTRAVENOUS at 09:07

## 2023-11-16 RX ADMIN — BARIUM SULFATE 900 ML: 21 SUSPENSION ORAL at 08:54

## 2024-01-01 NOTE — PROGRESS NOTES
Cardiology Consult/New Patient    Alison Slaughter DO          Ching Gaytan is a 58 y.o. female identifies as who presents with     She is here for cardiac evaluation   she was seen in the emergency room in October for chest pain with negative acute workup including D-dimer   the chest pain was sharp associated with palpitations      she does have significant hyperlipidemia  She has subclinical CAD with coronary calcium score of 13, November 2023 involving the left main          Lab work October 2023    Cholesterol 274 triglycerides 60      Patient Active Problem List    Diagnosis Date Noted   • Coronary artery calcification 01/09/2024   • Chest pain 01/09/2024   • Gastroesophageal reflux disease without esophagitis 10/26/2023   • Primary insomnia 10/26/2023   • Motion sickness 10/26/2023   • Adenomatous polyp of colon 11/07/2022   • Pure hypercholesterolemia 07/08/2021   • Vitamin D deficiency 09/18/2019   • Umbilical hernia 09/18/2019   • Dysfunction of eustachian tube 09/18/2019       Medical History:   Past Medical History:   Diagnosis Date   • Benign insulinoma    • High cholesterol    • Umbilical hernia    • Vitamin D deficiency        Surgical History:   Past Surgical History:   Procedure Laterality Date   • PANCREAS SURGERY  1990    insulinoma       Allergies: Patient has no known allergies.    Current Outpatient Medications   Medication Sig Dispense Refill   • aspirin 81 mg enteric coated tablet Take 1 tablet (81 mg total) by mouth daily. 90 tablet 1   • cholecalciferol, vitamin D3, 10 mcg (400 unit) capsule Vitamin D3 400 unit capsule   Take by oral route.     • clotrimazole-betamethasone (LOTRISONE) 1-0.05 % cream Apply topically 2 times daily as needed.     • omeprazole (PriLOSEC) 20 mg capsule Take 20 mg by mouth daily before breakfast.     • rosuvastatin (CRESTOR) 20 mg tablet Take 1 tablet (20 mg total) by mouth daily. 90 tablet 1   • scopolamine (TRANSDERM-SCOP) 1 mg over 3 days  transdermal patch PLACE 1 PATCH ON THE SKIN EVERY 3 DAYS.     • traZODone (DESYREL) 50 mg tablet Take 50 mg by mouth.     • vit A,C and E-lutein-minerals 300 mcg-200 mg-27 mg-2 mg tablet Take 1 tablet by mouth daily.       No current facility-administered medications for this visit.       Social History:   Social History     Socioeconomic History   • Marital status:      Spouse name: None   • Number of children: None   • Years of education: None   • Highest education level: None   Tobacco Use   • Smoking status: Never   • Smokeless tobacco: Never   Substance and Sexual Activity   • Alcohol use: Yes     Alcohol/week: 14.0 standard drinks of alcohol     Types: 14 Glasses of wine per week   • Drug use: Never   Social History Narrative    Do you wear your seatbelt? yes    Do you have smoke detector in your home? yes    Do you have a carbon monoxide detector in your home? 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         Social Determinants of Health     Food Insecurity: No Food Insecurity (10/17/2023)    Hunger Vital Sign    • Worried About Running Out of Food in the Last Year: Never true    • Ran Out of Food in the Last Year: Never true       Family History:   Family History   Problem Relation Age of Onset   • Hyperlipidemia Biological Mother    • Hyperlipidemia Biological Father    • Parkinsonism Biological Father    • Dementia Biological Father    • Lymphoma Biological Sister    • Lymphoma Biological Brother        Review of Systems   ROS    Objective       Vitals:    01/09/24 1551   BP: 122/80   Pulse: 85   SpO2: 98%       Physical Exam  Constitutional:       General: She is not in acute distress.     Appearance: She is well-developed.   HENT:      Head: Normocephalic and atraumatic.      Nose: Nose normal.   Eyes:      General: No scleral icterus.     Conjunctiva/sclera: Conjunctivae normal.   Neck:      Vascular: No JVD.   Cardiovascular:      Rate and Rhythm: Normal rate and regular  rhythm.      Pulses: Intact distal pulses.      Heart sounds: Normal heart sounds. No murmur heard.     No friction rub. No gallop.   Pulmonary:      Effort: Pulmonary effort is normal. No respiratory distress.      Breath sounds: No stridor. No wheezing or rales.   Chest:      Chest wall: No tenderness.   Abdominal:      Tenderness: There is no abdominal tenderness.   Musculoskeletal:         General: No deformity.   Skin:     General: Skin is warm and dry.   Neurological:      Mental Status: She is alert and oriented to person, place, and time.          Labs   Lab Results   Component Value Date    WBC 8.15 10/17/2023    HGB 13.8 10/17/2023    HCT 41.6 10/17/2023     10/17/2023    CHOL 274 (H) 10/26/2023    TRIG 60 10/26/2023     10/26/2023    ALT 30 10/17/2023    AST 27 10/17/2023     10/17/2023    K 4.7 10/17/2023     10/17/2023    CREATININE 0.6 10/17/2023    BUN 10 10/17/2023    CO2 29 10/17/2023    TSH 2.45 10/17/2023       Imaging    Cat  Cor vitor scosre 13 2023    Cor cta jan 2024  SUMMARY:  1. Mild nonobstructive single vessel coronary artery disease with focal,  calcified plaque at the ostium of the left anterior descending causing minimal  stenosis.  2. Normal cardiac structures.  3. Coronary calcium score 8      ECG     January 2024 normal EKG          Oct 2023 normal      Assessment/Plan     Coronary artery calcification  She has CAD diagnosed with a coronary calcium score of 13 in 2023.  She had an episode of chest pain was seen in the emergency room acute workup was negative including a D-dimer chest pain was atypical sharp in nature EKG was normal  Will exclude ischemic issues with coronary CT angiogram  She does have hyperlipidemia and with coronary calcifications will begin rosuvastatin    Pure hypercholesterolemia  LDL cholesterol 160  but with evidence of coronary calcifications will begin statin therapy rosuvastatin    She was seen in the emergency room for  atypical chest pain risk factors include hyperlipidemia  She has coronary artery disease with coronary calcium score of 13  Started on rosuvastatin we will do ischemic evaluation with coronary CT angiogram if not covered we will do stress echocardiogram follow-up in a few months with lab work on the rosuvastatin and after cardiac imaging           Олег Oneill MD  1/15/2024

## 2024-01-09 ENCOUNTER — OFFICE VISIT (OUTPATIENT)
Dept: CARDIOLOGY | Facility: CLINIC | Age: 59
End: 2024-01-09
Payer: COMMERCIAL

## 2024-01-09 VITALS
HEIGHT: 63 IN | DIASTOLIC BLOOD PRESSURE: 80 MMHG | OXYGEN SATURATION: 98 % | SYSTOLIC BLOOD PRESSURE: 122 MMHG | WEIGHT: 184 LBS | BODY MASS INDEX: 32.6 KG/M2 | HEART RATE: 85 BPM

## 2024-01-09 DIAGNOSIS — R07.9 CHEST PAIN, UNSPECIFIED TYPE: ICD-10-CM

## 2024-01-09 DIAGNOSIS — I25.10 CORONARY ARTERY CALCIFICATION: Primary | ICD-10-CM

## 2024-01-09 DIAGNOSIS — E78.00 PURE HYPERCHOLESTEROLEMIA: ICD-10-CM

## 2024-01-09 PROCEDURE — 3008F BODY MASS INDEX DOCD: CPT | Performed by: INTERNAL MEDICINE

## 2024-01-09 PROCEDURE — 99204 OFFICE O/P NEW MOD 45 MIN: CPT | Performed by: INTERNAL MEDICINE

## 2024-01-09 PROCEDURE — 93000 ELECTROCARDIOGRAM COMPLETE: CPT | Performed by: INTERNAL MEDICINE

## 2024-01-09 RX ORDER — ASPIRIN 81 MG/1
81 TABLET ORAL DAILY
Qty: 90 TABLET | Refills: 1 | Status: SHIPPED | OUTPATIENT
Start: 2024-01-09 | End: 2024-02-29 | Stop reason: SDUPTHER

## 2024-01-09 RX ORDER — ROSUVASTATIN CALCIUM 20 MG/1
20 TABLET, COATED ORAL DAILY
Qty: 90 TABLET | Refills: 1 | Status: SHIPPED | OUTPATIENT
Start: 2024-01-09 | End: 2024-02-29 | Stop reason: SDUPTHER

## 2024-01-09 NOTE — PATIENT INSTRUCTIONS
New crestor  Cor cta if not covered   Stress echo  Labs after on crestor  2 months  Follow up after

## 2024-01-15 NOTE — ASSESSMENT & PLAN NOTE
She has CAD diagnosed with a coronary calcium score of 13 in 2023.  She had an episode of chest pain was seen in the emergency room acute workup was negative including a D-dimer chest pain was atypical sharp in nature EKG was normal  Will exclude ischemic issues with coronary CT angiogram  She does have hyperlipidemia and with coronary calcifications will begin rosuvastatin

## 2024-01-15 NOTE — ASSESSMENT & PLAN NOTE
LDL cholesterol 160  but with evidence of coronary calcifications will begin statin therapy rosuvastatin

## 2024-01-16 ENCOUNTER — TELEPHONE (OUTPATIENT)
Dept: SCHEDULING | Facility: CLINIC | Age: 59
End: 2024-01-16
Payer: COMMERCIAL

## 2024-01-16 RX ORDER — TRAZODONE HYDROCHLORIDE 50 MG/1
50 TABLET ORAL NIGHTLY PRN
Qty: 90 TABLET | Refills: 0 | Status: SHIPPED | OUTPATIENT
Start: 2024-01-16 | End: 2024-04-10

## 2024-01-16 NOTE — TELEPHONE ENCOUNTER
MELQUIADES @ [facility]: [id number] 1/16/24-4/14/24      Please call pt to give auth info and to assist with scheduling testing

## 2024-01-17 ENCOUNTER — TELEPHONE (OUTPATIENT)
Dept: SCHEDULING | Facility: CLINIC | Age: 59
End: 2024-01-17
Payer: COMMERCIAL

## 2024-01-17 DIAGNOSIS — E78.00 PURE HYPERCHOLESTEROLEMIA: Primary | ICD-10-CM

## 2024-01-17 NOTE — TELEPHONE ENCOUNTER
FYI - Pt of Dr. Oneill scheduled for CTA. Call received from Radiology Screening RN, Shanita to request BUN/Creatinine order for patient.    Orders entered and will be given to pt in preparation for study.    Thank you.

## 2024-01-18 ENCOUNTER — TELEPHONE (OUTPATIENT)
Dept: RADIOLOGY | Age: 59
End: 2024-01-18
Payer: COMMERCIAL

## 2024-01-19 LAB
BUN SERPL-MCNC: 9 MG/DL (ref 6–24)
BUN/CREAT SERPL: 15 (ref 9–23)
CREAT SERPL-MCNC: 0.59 MG/DL (ref 0.57–1)
EGFRCR SERPLBLD CKD-EPI 2021: 104 ML/MIN/1.73

## 2024-01-23 ENCOUNTER — HOSPITAL ENCOUNTER (OUTPATIENT)
Dept: RADIOLOGY | Age: 59
Discharge: HOME | End: 2024-01-23
Attending: INTERNAL MEDICINE
Payer: COMMERCIAL

## 2024-01-23 VITALS
SYSTOLIC BLOOD PRESSURE: 128 MMHG | RESPIRATION RATE: 16 BRPM | DIASTOLIC BLOOD PRESSURE: 78 MMHG | BODY MASS INDEX: 31.01 KG/M2 | OXYGEN SATURATION: 99 % | HEIGHT: 63 IN | WEIGHT: 175 LBS | HEART RATE: 61 BPM

## 2024-01-23 DIAGNOSIS — I25.10 CORONARY ARTERY CALCIFICATION: ICD-10-CM

## 2024-01-23 DIAGNOSIS — R07.9 CHEST PAIN, UNSPECIFIED TYPE: ICD-10-CM

## 2024-01-23 PROCEDURE — 75574 CT ANGIO HRT W/3D IMAGE: CPT | Mod: MG

## 2024-01-23 PROCEDURE — 63700000 HC SELF-ADMINISTRABLE DRUG: Performed by: INTERNAL MEDICINE

## 2024-01-23 PROCEDURE — 63600105 HC IODINE BASED CONTRAST: Mod: JW | Performed by: INTERNAL MEDICINE

## 2024-01-23 RX ORDER — NITROGLYCERIN 0.4 MG/1
0.4 TABLET SUBLINGUAL ONCE
Status: COMPLETED | OUTPATIENT
Start: 2024-01-23 | End: 2024-01-23

## 2024-01-23 RX ORDER — METOPROLOL TARTRATE 50 MG/1
50 TABLET ORAL ONCE
Status: COMPLETED | OUTPATIENT
Start: 2024-01-23 | End: 2024-01-23

## 2024-01-23 RX ORDER — METOPROLOL TARTRATE 50 MG/1
50 TABLET ORAL EVERY 30 MIN PRN
Status: DISCONTINUED | OUTPATIENT
Start: 2024-01-23 | End: 2024-01-24 | Stop reason: HOSPADM

## 2024-01-23 RX ORDER — IOPAMIDOL 755 MG/ML
75 INJECTION, SOLUTION INTRAVASCULAR
Status: COMPLETED | OUTPATIENT
Start: 2024-01-23 | End: 2024-01-23

## 2024-01-23 RX ORDER — METOPROLOL TARTRATE 1 MG/ML
5 INJECTION, SOLUTION INTRAVENOUS AS NEEDED
Status: DISCONTINUED | OUTPATIENT
Start: 2024-01-23 | End: 2024-01-24 | Stop reason: HOSPADM

## 2024-01-23 RX ADMIN — METOPROLOL TARTRATE 50 MG: 50 TABLET, FILM COATED ORAL at 10:03

## 2024-01-23 RX ADMIN — IOPAMIDOL 75 ML: 755 INJECTION, SOLUTION INTRAVENOUS at 10:52

## 2024-01-23 RX ADMIN — NITROGLYCERIN 0.4 MG: 0.4 TABLET SUBLINGUAL at 10:47

## 2024-01-23 NOTE — PROGRESS NOTES
Staff present during Radiology Nurse encounter:    Nurse: ALONDRA Valentin  Technologist: RAUL Briggs  Cardiologist: Dr. Valentino  Ordering Physician: Dr. Oneill  Clinical Indication: coronary artery calcifications, chest pain    Cardiac CTA Worksheet    Chest pain (symptomatic patients):  Intermediate pre-test probability of Coronary Artery Disease    metoprolol tartrate (LOPRESSOR) 50mg and nitroglycerin (NITROSTAT) .4mg   ISOVUE_370: 75ml  IV access: 20g ML right AC    Prospective scan   HR final run: 57/53/56/61/61

## 2024-01-23 NOTE — DISCHARGE INSTRUCTIONS
Ching Gaytan   [id number]   01/23/24    Cardiac CTA Patient Discharge Instructions      Thank you for allowing our CT staff to participate in your care today.  We would like to provide you with some easy to follow instructions before you leave.    DRINK PLENTY OF FLUIDS  Now that your scan is complete, you will need to drink plenty of fluids, preferably water.    DO NOT drink any caffeinated beverages the rest of the day (coffee, tea, caffeinated sodas).    DO NOT drink any alcoholic beverages for the next 24 hours.  We ask you to drink these fluids to dilute the x-ray dye that was used for your scan and allow it to flush through your kidneys.  Caffeine and alcohol will not allow this flushing to occur effectively.       MEDICATION CHANGES:  no    If you have any questions about this, please contact your primary care physician.    If you need immediate medical attention, please go to the nearest Emergency Department or dial 911.    By signing this form, I acknowledge these instructions have been explained to me to my satisfaction and all my questions have been answered.  I have received a copy of this form.

## 2024-01-25 ENCOUNTER — TELEPHONE (OUTPATIENT)
Dept: CARDIOLOGY | Facility: CLINIC | Age: 59
End: 2024-01-25
Payer: COMMERCIAL

## 2024-01-25 NOTE — TELEPHONE ENCOUNTER
Can you let her know that her coronary CTA showed minimal coronary plaque no change in her therapy I will see her at her visit in a month

## 2024-01-25 NOTE — TELEPHONE ENCOUNTER
Called and spoke with Ching.  Dr. Oneill reviewed results from coronary CTA.  It showed that you have minimal coronary plaque.  No changed in therapy.  Continue rosuvastatin.  She tunde see you at your next appointment.  Ching understands.  No questions.

## 2024-02-29 DIAGNOSIS — E78.00 PURE HYPERCHOLESTEROLEMIA: Primary | ICD-10-CM

## 2024-02-29 DIAGNOSIS — I25.10 CORONARY ARTERY CALCIFICATION: ICD-10-CM

## 2024-02-29 RX ORDER — ASPIRIN 81 MG/1
81 TABLET ORAL DAILY
Qty: 90 TABLET | Refills: 1 | Status: SHIPPED | OUTPATIENT
Start: 2024-02-29 | End: 2024-08-26

## 2024-02-29 RX ORDER — ROSUVASTATIN CALCIUM 20 MG/1
20 TABLET, COATED ORAL DAILY
Qty: 90 TABLET | Refills: 1 | Status: SHIPPED | OUTPATIENT
Start: 2024-02-29 | End: 2024-08-26

## 2024-03-04 NOTE — PROGRESS NOTES
Cardiology Consult/New Patient    Alison Slaughter DO          Ching Gaytan is a 58 y.o. female identifies as who presents with     She returns after coronary CT angiogram January 2024 that showed minimal epicardial coronary artery disease with coronary calcium score of 8  She has hyperlipidemia and was started on statin therapy baseline LDL cholesterol was 160 on statin therapy, and is now at goal at 57  Minimal isolated ALT elevation of 49        Lab work March 2024  Cholesterol 164 HDL 95 LDL 57   down from an LDL of 164 total cholesterol cholesterol down from 274  CBC normal glucose 101    Creatinine 0.65 potassium 4.2  ALT minimally elevated 49                      Patient Active Problem List    Diagnosis Date Noted   • LFT elevation 03/13/2024   • Coronary artery calcification 01/09/2024   • Chest pain 01/09/2024   • Gastroesophageal reflux disease without esophagitis 10/26/2023   • Primary insomnia 10/26/2023   • Motion sickness 10/26/2023   • Adenomatous polyp of colon 11/07/2022   • Pure hypercholesterolemia 07/08/2021   • Vitamin D deficiency 09/18/2019   • Umbilical hernia 09/18/2019   • Dysfunction of eustachian tube 09/18/2019       Medical History:   Past Medical History:   Diagnosis Date   • Benign insulinoma    • High cholesterol    • Umbilical hernia    • Vitamin D deficiency        Surgical History:   Past Surgical History:   Procedure Laterality Date   • PANCREAS SURGERY  1990    insulinoma       Allergies: Patient has no known allergies.    Current Outpatient Medications   Medication Sig Dispense Refill   • aspirin 81 mg enteric coated tablet Take 1 tablet (81 mg total) by mouth daily. 90 tablet 1   • cholecalciferol, vitamin D3, 10 mcg (400 unit) capsule Vitamin D3 400 unit capsule   Take by oral route.     • omeprazole (PriLOSEC) 20 mg capsule Take 20 mg by mouth daily before breakfast.     • rosuvastatin (CRESTOR) 20 mg tablet Take 1 tablet (20 mg total) by mouth daily. 90 tablet 1   •  scopolamine (TRANSDERM-SCOP) 1 mg over 3 days transdermal patch PLACE 1 PATCH ON THE SKIN EVERY 3 DAYS.     • traZODone (DESYREL) 50 mg tablet Take 1 tablet (50 mg total) by mouth nightly as needed for sleep. 90 tablet 0   • vit A,C and E-lutein-minerals 300 mcg-200 mg-27 mg-2 mg tablet Take 1 tablet by mouth daily.       No current facility-administered medications for this visit.       Social History:   Social History     Socioeconomic History   • Marital status:      Spouse name: None   • Number of children: None   • Years of education: None   • Highest education level: None   Tobacco Use   • Smoking status: Never   • Smokeless tobacco: Never   Substance and Sexual Activity   • Alcohol use: Yes     Alcohol/week: 14.0 standard drinks of alcohol     Types: 14 Glasses of wine per week   • Drug use: Never   Social History Narrative    Do you wear your seatbelt? yes    Do you have smoke detector in your home? yes    Do you have a carbon monoxide detector in your home? 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         Social Determinants of Health     Food Insecurity: No Food Insecurity (10/17/2023)    Hunger Vital Sign    • Worried About Running Out of Food in the Last Year: Never true    • Ran Out of Food in the Last Year: Never true       Family History:   Family History   Problem Relation Age of Onset   • Hyperlipidemia Biological Mother    • Hyperlipidemia Biological Father    • Parkinsonism Biological Father    • Dementia Biological Father    • Lymphoma Biological Sister    • Lymphoma Biological Brother        Review of Systems   ROS    Objective       Vitals:    03/13/24 1128   BP: 120/80   Pulse: 74   SpO2: 98%       Physical Exam  Constitutional:       General: She is not in acute distress.     Appearance: She is well-developed.   HENT:      Head: Normocephalic and atraumatic.      Nose: Nose normal.   Eyes:      General: No scleral icterus.     Conjunctiva/sclera: Conjunctivae  normal.   Neck:      Vascular: No JVD.   Cardiovascular:      Rate and Rhythm: Normal rate and regular rhythm.      Pulses: Intact distal pulses.      Heart sounds: No murmur heard.     No friction rub. No gallop.   Pulmonary:      Effort: Pulmonary effort is normal. No respiratory distress.      Breath sounds: No stridor. No wheezing or rales.   Chest:      Chest wall: No tenderness.   Abdominal:      Tenderness: There is no abdominal tenderness.   Musculoskeletal:         General: No deformity.   Skin:     General: Skin is warm and dry.   Neurological:      Mental Status: She is alert and oriented to person, place, and time.          Labs   Lab Results   Component Value Date    WBC 7.6 03/11/2024    HGB 13.3 03/11/2024    HCT 39.7 03/11/2024     03/11/2024    CHOL 164 03/11/2024    TRIG 57 03/11/2024    HDL 95 03/11/2024    ALT 49 (H) 03/11/2024    AST 30 03/11/2024     03/11/2024    K 4.2 03/11/2024     03/11/2024    CREATININE 0.65 03/11/2024    BUN 13 03/11/2024    CO2 25 03/11/2024    TSH 2.45 10/17/2023       Imaging    Cat  Cor vitor scosre 13 2023    Cor cta jan 2024  SUMMARY:  1. Mild nonobstructive single vessel coronary artery disease with focal,  calcified plaque at the ostium of the left anterior descending causing minimal  stenosis.  2. Normal cardiac structures.  3. Coronary calcium score 8      ECG     J    Assessment/Plan     Coronary artery calcification  Minimal epicardial coronary artery disease with coronary calcium score of 13 CT angiogram performed January 2024    Pure hypercholesterolemia  On rosuvastatin 20 LDL cholesterol dropped from 160, to 57 she is now at goal minimal AST elevation of 49 recheck in 2 to 3 months if remains elevated will reduce rosuvastatin dose to every other day and recheck lipids and LFTs, in 6 months    She was seen in the emergency room for atypical chest pain risk factors include hyperlipidemia  She has minimal epicardial coronary artery disease  coronary artery disease with coronary calcium score of 13 coronary CT angiogram January 2024  Cholesterol is perfect minimal elevation of AST at 49  Recheck in 3 months she will call me for results if elevated will reduce rosuvastatin dose to every other day    Олег Oneill MD  3/13/2024

## 2024-03-07 DIAGNOSIS — E78.00 PURE HYPERCHOLESTEROLEMIA: Primary | ICD-10-CM

## 2024-03-07 DIAGNOSIS — I25.10 CORONARY ARTERY CALCIFICATION: ICD-10-CM

## 2024-03-12 LAB
ALBUMIN SERPL-MCNC: 4.4 G/DL (ref 3.8–4.9)
ALBUMIN/GLOB SERPL: 1.8 {RATIO} (ref 1.2–2.2)
ALP SERPL-CCNC: 112 IU/L (ref 44–121)
ALT SERPL-CCNC: 49 IU/L (ref 0–32)
AST SERPL-CCNC: 30 IU/L (ref 0–40)
BASOPHILS # BLD AUTO: 0.1 X10E3/UL (ref 0–0.2)
BASOPHILS NFR BLD AUTO: 1 %
BILIRUB SERPL-MCNC: 0.3 MG/DL (ref 0–1.2)
BUN SERPL-MCNC: 13 MG/DL (ref 6–24)
BUN/CREAT SERPL: 20 (ref 9–23)
CALCIUM SERPL-MCNC: 9.5 MG/DL (ref 8.7–10.2)
CHLORIDE SERPL-SCNC: 103 MMOL/L (ref 96–106)
CHOLEST SERPL-MCNC: 164 MG/DL (ref 100–199)
CO2 SERPL-SCNC: 25 MMOL/L (ref 20–29)
CREAT SERPL-MCNC: 0.65 MG/DL (ref 0.57–1)
EGFRCR SERPLBLD CKD-EPI 2021: 102 ML/MIN/1.73
EOSINOPHIL # BLD AUTO: 0.2 X10E3/UL (ref 0–0.4)
EOSINOPHIL NFR BLD AUTO: 2 %
ERYTHROCYTE [DISTWIDTH] IN BLOOD BY AUTOMATED COUNT: 12.7 % (ref 11.7–15.4)
GLOBULIN SER CALC-MCNC: 2.4 G/DL (ref 1.5–4.5)
GLUCOSE SERPL-MCNC: 101 MG/DL (ref 70–99)
HCT VFR BLD AUTO: 39.7 % (ref 34–46.6)
HDLC SERPL-MCNC: 95 MG/DL
HGB BLD-MCNC: 13.3 G/DL (ref 11.1–15.9)
IMM GRANULOCYTES # BLD AUTO: 0 X10E3/UL (ref 0–0.1)
IMM GRANULOCYTES NFR BLD AUTO: 0 %
LDLC SERPL CALC-MCNC: 57 MG/DL (ref 0–99)
LYMPHOCYTES # BLD AUTO: 2.2 X10E3/UL (ref 0.7–3.1)
LYMPHOCYTES NFR BLD AUTO: 29 %
MCH RBC QN AUTO: 30.4 PG (ref 26.6–33)
MCHC RBC AUTO-ENTMCNC: 33.5 G/DL (ref 31.5–35.7)
MCV RBC AUTO: 91 FL (ref 79–97)
MONOCYTES # BLD AUTO: 0.5 X10E3/UL (ref 0.1–0.9)
MONOCYTES NFR BLD AUTO: 7 %
NEUTROPHILS # BLD AUTO: 4.6 X10E3/UL (ref 1.4–7)
NEUTROPHILS NFR BLD AUTO: 61 %
PLATELET # BLD AUTO: 254 X10E3/UL (ref 150–450)
POTASSIUM SERPL-SCNC: 4.2 MMOL/L (ref 3.5–5.2)
PROT SERPL-MCNC: 6.8 G/DL (ref 6–8.5)
RBC # BLD AUTO: 4.37 X10E6/UL (ref 3.77–5.28)
SODIUM SERPL-SCNC: 143 MMOL/L (ref 134–144)
TRIGL SERPL-MCNC: 57 MG/DL (ref 0–149)
VLDLC SERPL CALC-MCNC: 12 MG/DL (ref 5–40)
WBC # BLD AUTO: 7.6 X10E3/UL (ref 3.4–10.8)

## 2024-03-13 ENCOUNTER — OFFICE VISIT (OUTPATIENT)
Dept: CARDIOLOGY | Facility: CLINIC | Age: 59
End: 2024-03-13
Payer: COMMERCIAL

## 2024-03-13 VITALS
HEART RATE: 74 BPM | OXYGEN SATURATION: 98 % | DIASTOLIC BLOOD PRESSURE: 80 MMHG | WEIGHT: 182 LBS | SYSTOLIC BLOOD PRESSURE: 120 MMHG | HEIGHT: 63 IN | BODY MASS INDEX: 32.25 KG/M2

## 2024-03-13 DIAGNOSIS — E78.00 PURE HYPERCHOLESTEROLEMIA: Primary | ICD-10-CM

## 2024-03-13 DIAGNOSIS — R79.89 LFT ELEVATION: ICD-10-CM

## 2024-03-13 LAB
ATRIAL RATE: 63
P AXIS: 48
PR INTERVAL: 146
QRS DURATION: 76
QT INTERVAL: 382
QTC CALCULATION(BAZETT): 390
R AXIS: 13
T WAVE AXIS: 26
VENTRICULAR RATE: 63

## 2024-03-13 PROCEDURE — 3008F BODY MASS INDEX DOCD: CPT | Performed by: INTERNAL MEDICINE

## 2024-03-13 PROCEDURE — 93000 ELECTROCARDIOGRAM COMPLETE: CPT | Performed by: INTERNAL MEDICINE

## 2024-03-13 PROCEDURE — 99213 OFFICE O/P EST LOW 20 MIN: CPT | Performed by: INTERNAL MEDICINE

## 2024-03-13 NOTE — ASSESSMENT & PLAN NOTE
On rosuvastatin 20 LDL cholesterol dropped from 160, to 57 she is now at goal minimal AST elevation of 49 recheck in 2 to 3 months if remains elevated will reduce rosuvastatin dose to every other day and recheck lipids and LFTs, in 6 months

## 2024-03-13 NOTE — ASSESSMENT & PLAN NOTE
Minimal epicardial coronary artery disease with coronary calcium score of 13 CT angiogram performed January 2024

## 2024-05-13 RX ORDER — TRAZODONE HYDROCHLORIDE 50 MG/1
50 TABLET ORAL NIGHTLY PRN
Qty: 30 TABLET | Refills: 2 | Status: SHIPPED | OUTPATIENT
Start: 2024-05-13 | End: 2024-08-14

## 2024-05-22 ENCOUNTER — APPOINTMENT (RX ONLY)
Dept: URBAN - METROPOLITAN AREA CLINIC 31 | Facility: CLINIC | Age: 59
Setting detail: DERMATOLOGY
End: 2024-05-22

## 2024-05-22 DIAGNOSIS — D22 MELANOCYTIC NEVI: ICD-10-CM

## 2024-05-22 DIAGNOSIS — D18.0 HEMANGIOMA: ICD-10-CM

## 2024-05-22 DIAGNOSIS — L81.4 OTHER MELANIN HYPERPIGMENTATION: ICD-10-CM

## 2024-05-22 DIAGNOSIS — L82.1 OTHER SEBORRHEIC KERATOSIS: ICD-10-CM

## 2024-05-22 PROBLEM — D18.01 HEMANGIOMA OF SKIN AND SUBCUTANEOUS TISSUE: Status: ACTIVE | Noted: 2024-05-22

## 2024-05-22 PROBLEM — D22.5 MELANOCYTIC NEVI OF TRUNK: Status: ACTIVE | Noted: 2024-05-22

## 2024-05-22 PROBLEM — D22.72 MELANOCYTIC NEVI OF LEFT LOWER LIMB, INCLUDING HIP: Status: ACTIVE | Noted: 2024-05-22

## 2024-05-22 PROCEDURE — ? FULL BODY SKIN EXAM

## 2024-05-22 PROCEDURE — ? COUNSELING

## 2024-05-22 PROCEDURE — 99213 OFFICE O/P EST LOW 20 MIN: CPT

## 2024-05-22 PROCEDURE — ? TREATMENT REGIMEN

## 2024-05-22 PROCEDURE — ? OBSERVATION AND MEASURE

## 2024-05-22 ASSESSMENT — LOCATION SIMPLE DESCRIPTION DERM
LOCATION SIMPLE: UPPER BACK
LOCATION SIMPLE: LEFT PLANTAR SURFACE

## 2024-05-22 ASSESSMENT — LOCATION ZONE DERM
LOCATION ZONE: FEET
LOCATION ZONE: TRUNK

## 2024-05-22 ASSESSMENT — LOCATION DETAILED DESCRIPTION DERM
LOCATION DETAILED: SUPERIOR THORACIC SPINE
LOCATION DETAILED: INFERIOR THORACIC SPINE
LOCATION DETAILED: LEFT MEDIAL PLANTAR MIDFOOT

## 2024-05-29 ENCOUNTER — OFFICE VISIT (OUTPATIENT)
Dept: FAMILY MEDICINE | Facility: CLINIC | Age: 59
End: 2024-05-29
Payer: COMMERCIAL

## 2024-05-29 VITALS
TEMPERATURE: 97.3 F | OXYGEN SATURATION: 97 % | HEIGHT: 63 IN | HEART RATE: 81 BPM | WEIGHT: 178.2 LBS | DIASTOLIC BLOOD PRESSURE: 78 MMHG | BODY MASS INDEX: 31.57 KG/M2 | SYSTOLIC BLOOD PRESSURE: 122 MMHG

## 2024-05-29 DIAGNOSIS — E66.811 CLASS 1 OBESITY DUE TO EXCESS CALORIES WITHOUT SERIOUS COMORBIDITY WITH BODY MASS INDEX (BMI) OF 31.0 TO 31.9 IN ADULT: ICD-10-CM

## 2024-05-29 DIAGNOSIS — Z00.00 ENCOUNTER FOR GENERAL ADULT MEDICAL EXAMINATION WITHOUT ABNORMAL FINDINGS: Primary | ICD-10-CM

## 2024-05-29 DIAGNOSIS — E66.09 CLASS 1 OBESITY DUE TO EXCESS CALORIES WITHOUT SERIOUS COMORBIDITY WITH BODY MASS INDEX (BMI) OF 31.0 TO 31.9 IN ADULT: ICD-10-CM

## 2024-05-29 PROBLEM — R00.2 PALPITATIONS: Status: ACTIVE | Noted: 2023-10-14

## 2024-05-29 PROBLEM — I73.00 RAYNAUD'S PHENOMENON WITHOUT GANGRENE: Status: ACTIVE | Noted: 2021-07-08

## 2024-05-29 PROBLEM — N92.0 MENORRHAGIA: Status: ACTIVE | Noted: 2019-09-18

## 2024-05-29 PROBLEM — D13.7 INSULINOMA: Status: ACTIVE | Noted: 2019-09-18

## 2024-05-29 PROBLEM — M25.559 HIP PAIN: Status: ACTIVE | Noted: 2019-09-18

## 2024-05-29 PROBLEM — R92.8 ABNORMAL MAMMOGRAM: Status: ACTIVE | Noted: 2019-09-18

## 2024-05-29 PROCEDURE — 3008F BODY MASS INDEX DOCD: CPT | Performed by: FAMILY MEDICINE

## 2024-05-29 PROCEDURE — 99396 PREV VISIT EST AGE 40-64: CPT | Performed by: FAMILY MEDICINE

## 2024-05-29 RX ORDER — SCOPOLAMINE 1 MG/3D
PATCH, EXTENDED RELEASE TRANSDERMAL
Qty: 24 PATCH | Refills: 1 | Status: SHIPPED | OUTPATIENT
Start: 2024-05-29

## 2024-05-29 NOTE — PROGRESS NOTES
HPI:  Ching Gaytan is an 58 y.o. female presenting for annual well visit.    1) few weeks of bruising on fingers, bilateral hands, resolved in a few days.  2) changes lifestyle, eating healthier and exercising regularly - would like to try Rybelsus    30 years ago insulinoma - post op pancreatitis. Has not needed any follow-up since that time. No additional episodes of pancreatitis    No personal or family history of MEN.      Allergies:  Sulfamethoxazole-trimethoprim    Past Medical History:   Diagnosis Date    Benign insulinoma     High cholesterol     Umbilical hernia     Vitamin D deficiency        Past Surgical History:   Procedure Laterality Date    PANCREAS SURGERY  1990    insulinoma       Social History     Tobacco Use    Smoking status: Never    Smokeless tobacco: Never   Substance Use Topics    Alcohol use: Yes     Alcohol/week: 14.0 standard drinks of alcohol     Types: 14 Glasses of wine per week       Current Outpatient Medications on File Prior to Visit   Medication Sig Dispense Refill    aspirin 81 mg enteric coated tablet Take 1 tablet (81 mg total) by mouth daily. 90 tablet 1    cholecalciferol, vitamin D3, 10 mcg (400 unit) capsule Vitamin D3 400 unit capsule   Take by oral route.      omeprazole (PriLOSEC) 20 mg capsule Take 20 mg by mouth daily before breakfast.      rosuvastatin (CRESTOR) 20 mg tablet Take 1 tablet (20 mg total) by mouth daily. 90 tablet 1    scopolamine (TRANSDERM-SCOP) 1 mg over 3 days transdermal patch PLACE 1 PATCH ON THE SKIN EVERY 3 DAYS.      traZODone (DESYREL) 50 mg tablet TAKE 1 TABLET BY MOUTH EVERY DAY AT BEDTIME AS NEEDED FOR SLEEP 30 tablet 2    vit A,C and E-lutein-minerals 300 mcg-200 mg-27 mg-2 mg tablet Take 1 tablet by mouth daily.       No current facility-administered medications on file prior to visit.       Family History   Problem Relation Age of Onset    Hyperlipidemia Biological Mother     Hyperlipidemia Biological Father     Parkinsonism  "Biological Father     Dementia Biological Father     Lymphoma Biological Sister     Lymphoma Biological Brother        Visit Vitals  /78 (BP Location: Left upper arm, Patient Position: Sitting)   Pulse 81   Temp 36.3 °C (97.3 °F) (Temporal)   Ht 1.6 m (5' 3\")   Wt 80.8 kg (178 lb 3.2 oz)   SpO2 97%   BMI 31.57 kg/m²        Physical Exam  Constitutional:       General: She is not in acute distress.     Appearance: Normal appearance. She is not ill-appearing.   HENT:      Right Ear: Tympanic membrane and ear canal normal.      Left Ear: Tympanic membrane and ear canal normal.      Nose: Nose normal.      Mouth/Throat:      Pharynx: Oropharynx is clear.   Eyes:      Extraocular Movements: Extraocular movements intact.      Pupils: Pupils are equal, round, and reactive to light.   Neck:      Thyroid: No thyromegaly.   Cardiovascular:      Rate and Rhythm: Normal rate and regular rhythm.      Pulses: Normal pulses.           Dorsalis pedis pulses are 2+ on the right side and 2+ on the left side.      Heart sounds: Normal heart sounds. No murmur heard.  Pulmonary:      Effort: Pulmonary effort is normal. No respiratory distress.      Breath sounds: Normal breath sounds. No wheezing, rhonchi or rales.   Abdominal:      General: Bowel sounds are normal.      Palpations: Abdomen is soft. There is no mass.      Tenderness: There is no abdominal tenderness. There is no guarding or rebound.      Comments: Healed scar upper abdomen   Musculoskeletal:         General: No swelling or tenderness. Normal range of motion.      Cervical back: Normal range of motion and neck supple.      Right lower leg: No edema.      Left lower leg: No edema.   Lymphadenopathy:      Cervical: No cervical adenopathy.   Skin:     Findings: No lesion or rash.   Neurological:      General: No focal deficit present.      Mental Status: She is alert.      Deep Tendon Reflexes: Reflexes normal.   Psychiatric:         Mood and Affect: Mood normal. "           A/P  1. Encounter for general adult medical examination without abnormal findings  Labs UTD per cardiology    2. Class 1 obesity due to excess calories without serious comorbidity with body mass index (BMI) of 31.0 to 31.9 in adult  Patient will check with insurance about weight loss treatment coverage. If she is not covered she would like to pay out of pocket.           Next appointment recommended:  TBD      The patient (parent) indicates an understanding of the events of today's medical evaluation and agrees to the plan of care.    I have asked the patient (parent) to be on the alert for new or worsening symptoms and to call the office directly or proceed to the nearest ER if such should occur.

## 2024-05-31 ENCOUNTER — TELEPHONE (OUTPATIENT)
Dept: FAMILY MEDICINE | Facility: CLINIC | Age: 59
End: 2024-05-31

## 2024-05-31 RX ORDER — ORAL SEMAGLUTIDE 3 MG/1
3 TABLET ORAL DAILY
Qty: 90 TABLET | Refills: 0 | Status: CANCELLED | OUTPATIENT
Start: 2024-05-31

## 2024-06-06 ENCOUNTER — OFFICE VISIT (OUTPATIENT)
Dept: FAMILY MEDICINE | Facility: CLINIC | Age: 59
End: 2024-06-06
Payer: COMMERCIAL

## 2024-06-06 VITALS
OXYGEN SATURATION: 99 % | HEIGHT: 63 IN | TEMPERATURE: 98.8 F | SYSTOLIC BLOOD PRESSURE: 124 MMHG | DIASTOLIC BLOOD PRESSURE: 78 MMHG | WEIGHT: 180.2 LBS | BODY MASS INDEX: 31.93 KG/M2 | HEART RATE: 80 BPM

## 2024-06-06 DIAGNOSIS — S16.1XXA ACUTE STRAIN OF NECK MUSCLE, INITIAL ENCOUNTER: Primary | ICD-10-CM

## 2024-06-06 PROCEDURE — 3008F BODY MASS INDEX DOCD: CPT | Performed by: FAMILY MEDICINE

## 2024-06-06 PROCEDURE — 99213 OFFICE O/P EST LOW 20 MIN: CPT | Performed by: FAMILY MEDICINE

## 2024-06-06 RX ORDER — TIZANIDINE 2 MG/1
2 TABLET ORAL EVERY 8 HOURS PRN
Qty: 90 TABLET | Refills: 0 | Status: SHIPPED | OUTPATIENT
Start: 2024-06-06

## 2024-06-06 RX ORDER — MELOXICAM 15 MG/1
15 TABLET ORAL DAILY
Qty: 30 TABLET | Refills: 0 | Status: SHIPPED | OUTPATIENT
Start: 2024-06-06 | End: 2024-07-08
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
## 2024-06-19 LAB
ALBUMIN SERPL-MCNC: 4.7 G/DL (ref 3.8–4.9)
ALP SERPL-CCNC: 112 IU/L (ref 44–121)
ALT SERPL-CCNC: 37 IU/L (ref 0–32)
AST SERPL-CCNC: 25 IU/L (ref 0–40)
BASOPHILS # BLD AUTO: 0 X10E3/UL (ref 0–0.2)
BASOPHILS NFR BLD AUTO: 1 %
BILIRUB SERPL-MCNC: 0.3 MG/DL (ref 0–1.2)
BUN SERPL-MCNC: 11 MG/DL (ref 6–24)
BUN/CREAT SERPL: 17 (ref 9–23)
CALCIUM SERPL-MCNC: 9.3 MG/DL (ref 8.7–10.2)
CHLORIDE SERPL-SCNC: 102 MMOL/L (ref 96–106)
CHOLEST SERPL-MCNC: 170 MG/DL (ref 100–199)
CO2 SERPL-SCNC: 27 MMOL/L (ref 20–29)
CREAT SERPL-MCNC: 0.65 MG/DL (ref 0.57–1)
EGFRCR SERPLBLD CKD-EPI 2021: 102 ML/MIN/1.73
EOSINOPHIL # BLD AUTO: 0.1 X10E3/UL (ref 0–0.4)
EOSINOPHIL NFR BLD AUTO: 2 %
ERYTHROCYTE [DISTWIDTH] IN BLOOD BY AUTOMATED COUNT: 12.6 % (ref 11.7–15.4)
GLOBULIN SER CALC-MCNC: 2.1 G/DL (ref 1.5–4.5)
GLUCOSE SERPL-MCNC: 105 MG/DL (ref 70–99)
HCT VFR BLD AUTO: 39.5 % (ref 34–46.6)
HDLC SERPL-MCNC: 91 MG/DL
HGB BLD-MCNC: 13.1 G/DL (ref 11.1–15.9)
IMM GRANULOCYTES # BLD AUTO: 0 X10E3/UL (ref 0–0.1)
IMM GRANULOCYTES NFR BLD AUTO: 0 %
LDLC SERPL CALC-MCNC: 66 MG/DL (ref 0–99)
LYMPHOCYTES # BLD AUTO: 1.5 X10E3/UL (ref 0.7–3.1)
LYMPHOCYTES NFR BLD AUTO: 24 %
MCH RBC QN AUTO: 30.5 PG (ref 26.6–33)
MCHC RBC AUTO-ENTMCNC: 33.2 G/DL (ref 31.5–35.7)
MCV RBC AUTO: 92 FL (ref 79–97)
MONOCYTES # BLD AUTO: 0.5 X10E3/UL (ref 0.1–0.9)
MONOCYTES NFR BLD AUTO: 7 %
NEUTROPHILS # BLD AUTO: 4.2 X10E3/UL (ref 1.4–7)
NEUTROPHILS NFR BLD AUTO: 66 %
PLATELET # BLD AUTO: 232 X10E3/UL (ref 150–450)
POTASSIUM SERPL-SCNC: 4.5 MMOL/L (ref 3.5–5.2)
PROT SERPL-MCNC: 6.8 G/DL (ref 6–8.5)
RBC # BLD AUTO: 4.3 X10E6/UL (ref 3.77–5.28)
SODIUM SERPL-SCNC: 141 MMOL/L (ref 134–144)
TRIGL SERPL-MCNC: 67 MG/DL (ref 0–149)
VLDLC SERPL CALC-MCNC: 13 MG/DL (ref 5–40)
WBC # BLD AUTO: 6.4 X10E3/UL (ref 3.4–10.8)

## 2024-06-28 RX ORDER — CLOTRIMAZOLE AND BETAMETHASONE DIPROPIONATE 10; .64 MG/G; MG/G
CREAM TOPICAL 2 TIMES DAILY
Qty: 45 G | Refills: 1 | Status: SHIPPED | OUTPATIENT
Start: 2024-06-28 | End: 2024-10-07

## 2024-06-28 NOTE — TELEPHONE ENCOUNTER
From: Ching Gaytan  To: Office of Alison Slaughter  Sent: 6/25/2024 5:59 PM EDT  Subject: Medication Renewal Request    Refills have been requested for the following medications:   Other - Lotrisone cream please    Preferred pharmacy: [id number] - [place] - [address]  AT [facility] [address]  Delivery method: Pickup

## 2024-08-14 RX ORDER — TRAZODONE HYDROCHLORIDE 50 MG/1
50 TABLET ORAL NIGHTLY PRN
Qty: 30 TABLET | Refills: 2 | Status: SHIPPED | OUTPATIENT
Start: 2024-08-14 | End: 2024-11-11

## 2024-08-25 DIAGNOSIS — E78.00 PURE HYPERCHOLESTEROLEMIA: ICD-10-CM

## 2024-08-25 DIAGNOSIS — I25.10 CORONARY ARTERY CALCIFICATION: ICD-10-CM

## 2024-08-26 RX ORDER — ROSUVASTATIN CALCIUM 20 MG/1
20 TABLET, COATED ORAL DAILY
Qty: 90 TABLET | Refills: 3 | Status: SHIPPED | OUTPATIENT
Start: 2024-08-26

## 2024-08-26 RX ORDER — ASPIRIN 81 MG/1
81 TABLET ORAL DAILY
Qty: 90 TABLET | Refills: 3 | Status: SHIPPED | OUTPATIENT
Start: 2024-08-26 | End: 2024-09-17 | Stop reason: SDUPTHER

## 2024-09-17 DIAGNOSIS — I25.10 CORONARY ARTERY CALCIFICATION: ICD-10-CM

## 2024-09-17 RX ORDER — ASPIRIN 81 MG/1
81 TABLET ORAL DAILY
Qty: 90 TABLET | Refills: 3 | Status: SHIPPED | OUTPATIENT
Start: 2024-09-17 | End: 2025-02-05 | Stop reason: SDUPTHER

## 2024-10-07 RX ORDER — CLOTRIMAZOLE AND BETAMETHASONE DIPROPIONATE 10; .64 MG/G; MG/G
CREAM TOPICAL SEE ADMIN INSTRUCTIONS
Qty: 45 G | Refills: 1 | Status: SHIPPED | OUTPATIENT
Start: 2024-10-07

## 2024-11-11 RX ORDER — TRAZODONE HYDROCHLORIDE 50 MG/1
50 TABLET ORAL NIGHTLY PRN
Qty: 30 TABLET | Refills: 2 | Status: SHIPPED | OUTPATIENT
Start: 2024-11-11 | End: 2025-02-04

## 2024-12-06 ENCOUNTER — TELEPHONE (OUTPATIENT)
Dept: CARDIOLOGY | Facility: CLINIC | Age: 59
End: 2024-12-06

## 2024-12-06 NOTE — TELEPHONE ENCOUNTER
I called Dr Oneill's patient today to schedule with Dr Sarmiento in [facility]. Patient stated she would rather be scheduled with Dr Clements in [place]. I scheduled patient for 3/6/2025 @ 8:30 am in [place] w/Tyree. The labs Dr Oneill previously orders were completed in June 2024.

## 2025-02-04 DIAGNOSIS — F51.01 PRIMARY INSOMNIA: Primary | ICD-10-CM

## 2025-02-04 RX ORDER — TRAZODONE HYDROCHLORIDE 50 MG/1
50 TABLET ORAL NIGHTLY PRN
Qty: 30 TABLET | Refills: 0 | Status: SHIPPED | OUTPATIENT
Start: 2025-02-04 | End: 2025-03-05

## 2025-02-05 DIAGNOSIS — I25.10 CORONARY ARTERY CALCIFICATION: ICD-10-CM

## 2025-02-05 RX ORDER — ASPIRIN 81 MG/1
81 TABLET ORAL DAILY
Qty: 90 TABLET | Refills: 3 | Status: SHIPPED | OUTPATIENT
Start: 2025-02-05

## 2025-03-05 DIAGNOSIS — F51.01 PRIMARY INSOMNIA: ICD-10-CM

## 2025-03-05 RX ORDER — TRAZODONE HYDROCHLORIDE 50 MG/1
50 TABLET ORAL NIGHTLY PRN
Qty: 30 TABLET | Refills: 0 | Status: SHIPPED | OUTPATIENT
Start: 2025-03-05 | End: 2025-04-02

## 2025-03-06 ENCOUNTER — TELEPHONE (OUTPATIENT)
Dept: CARDIOLOGY | Facility: CLINIC | Age: 60
End: 2025-03-06

## 2025-03-06 ENCOUNTER — OFFICE VISIT (OUTPATIENT)
Dept: CARDIOLOGY | Facility: CLINIC | Age: 60
End: 2025-03-06
Payer: COMMERCIAL

## 2025-03-06 VITALS
OXYGEN SATURATION: 98 % | HEART RATE: 74 BPM | SYSTOLIC BLOOD PRESSURE: 124 MMHG | BODY MASS INDEX: 32.25 KG/M2 | WEIGHT: 182 LBS | DIASTOLIC BLOOD PRESSURE: 76 MMHG | HEIGHT: 63 IN

## 2025-03-06 DIAGNOSIS — I25.10 CORONARY ARTERY CALCIFICATION: Primary | ICD-10-CM

## 2025-03-06 DIAGNOSIS — E78.2 MIXED HYPERLIPIDEMIA: ICD-10-CM

## 2025-03-06 LAB
ATRIAL RATE: 77
P AXIS: 46
PR INTERVAL: 152
QRS DURATION: 78
QT INTERVAL: 376
QTC CALCULATION(BAZETT): 425
R AXIS: 41
T WAVE AXIS: 41
VENTRICULAR RATE: 77

## 2025-03-06 PROCEDURE — 93000 ELECTROCARDIOGRAM COMPLETE: CPT | Performed by: STUDENT IN AN ORGANIZED HEALTH CARE EDUCATION/TRAINING PROGRAM

## 2025-03-06 PROCEDURE — 99214 OFFICE O/P EST MOD 30 MIN: CPT | Performed by: STUDENT IN AN ORGANIZED HEALTH CARE EDUCATION/TRAINING PROGRAM

## 2025-03-06 PROCEDURE — 3008F BODY MASS INDEX DOCD: CPT | Performed by: STUDENT IN AN ORGANIZED HEALTH CARE EDUCATION/TRAINING PROGRAM

## 2025-03-06 ASSESSMENT — ENCOUNTER SYMPTOMS
HEMATURIA: 0
SHORTNESS OF BREATH: 0
DIAPHORESIS: 0
LIGHT-HEADEDNESS: 0
PALPITATIONS: 0
STRIDOR: 0
COLOR CHANGE: 0
FATIGUE: 0

## 2025-03-06 NOTE — PROGRESS NOTES
Jerad Clements MD, [facility]  Non-invasive Cardiology    [facility]  [facility] [place]  [address] [place] [address]     [facility]  [address]  [address]  [place] [address]    [phone]    [facility]       3/6/2025        Patient Name: Ching Gaytan  Account:          [id number]  :               1965        Dear Alison Slaughter, DO:     I had the pleasure of seeing your patient, Ching Gaytan, on 3/6/2025. As you know, she is a 59 y.o. female with medical history as described below.     HPI  59 y.o. female with a history of coronary artery calcifications (CAC 8) and HLD who presents for cardiovascular evaluation. At today's visit the patient reports feeling well.  she denies having any chest pains, shortness of breath, or other anginal equivalents. she also denies having any palpitations or fast irregular heartbeats nor any episodes of presyncope/syncope.  she remains compliant with all her medications and has no major complaints for today.         The 10-year ASCVD risk score (Alexandria DK, et al., 2019) is: 1.8%    Values used to calculate the score:      Age: 59 years      Sex: Female      Is Non- : No      Diabetic: No      Tobacco smoker: No      Systolic Blood Pressure: 124 mmHg      Is BP treated: No      HDL Cholesterol: 79 mg/dL      Total Cholesterol: 161 mg/dL     CARDIOVASCULAR STUDIES:     Lab Results   Component Value Date    CHOL 161 2025    CHOL 170 2024    TRIG 58 2025    TRIG 67 2024    HDL 79 2025    HDL 91 2024    LDLCALC 70 2025    LDLCALC 66 2024       ECG: Independently reviewed:     CORONARY CALCIUM SCAN:  Results for orders placed during the hospital encounter of 23    CT HEART CORONARY ARTERY CALCIUM SCORE WITHOUT IV CONTRAST    Narrative  CLINICAL HISTORY: Hyperlipidemia and family history of heart  "disease.    COMMENT:    CT DOSE:  One or more dose reduction techniques (e.g. automated exposure  control, adjustment of the mA and/or kV according to patient size, use of  iterative reconstruction technique) utilized for this examination.    1) PROCEDURE: Cardiac gated axial CT noncontrast imaging of the heart was  performed. Images were processed with dedicated [employer] software on a dedicated  PACS Workstation, reviewed by the Radiologist and the coronary artery calcium  score was tabulated.    CT DOSE:  One or more dose reduction techniques (e.g. automated exposure  control, adjustment of the mA and/or kV according to patient size, use of  iterative reconstruction technique) utilized for this examination.    2) CARDIAC FINDINGS:    CORONARY CALCIUM COMPOSITE AGATSTON SCORE: 13    Left Main: 13    LAD: 0    LCX: 0    RCA: 0      3) NON-CARDIAC FINDINGS    AORTA: Unremarkable.    PULMONARY VEINS: Unremarkable.    PULMONARY ARTERIES: Unremarkable.    SVC: Unremarkable.    IVC: Unremarkable.    LUNG GRANT: Limited Evaluation. Imaged portions of the lungs are clear.    LYMPH NODES: No pathologically enlarged lymph nodes identified.    OSSEOUS STRUCTURES: No evidence of suspicious destructive bony lesions.    Impression  IMPRESSION:    Composite Agatston coronary artery calcium score of 13, which is between the 50  and 75 percentile for females between the ages of 55 and 64, as per JENSEN 2006  Database. This correlates to \"definite, at least mild atherosclerotic plaque\"  with \"mild or minimal coronary narrowings likely\".      CORONARY CTA:  Results for orders placed during the hospital encounter of 01/23/24    CTA CORONARY ARTERY WITH IV CONTRAST    Narrative  CTA CORONARY    CLINICAL HISTORY: Chest pain and coronary calcification    COMMENT: The patient was brought into the CT prep room in stable condition. The  patient was n.p.o. for 4 hours. The patient's heart rate and blood pressure were  recorded. Multi-detector " CT of the chest/heart was then performed. Post  processing for 3D evaluation was completed on an independent workstation. The  patient tolerated the procedure well and was discharged in stable condition.    MEDICATIONS:  Ivabradine 0 mg  Metoprolol 50 mg  Metoprolol 0 mg  Sublingual Nitroglycerin 0.4 mg    CONTRAST: Isovue  75 ml    TECHNIQUE: A Prospective ECG gated and 100 kVp.    HEARTFLOW FFRct ANALYSIS: Not performed    2) CARDIAC FINDINGS:    CORONARY CALCIUM COMPOSITE AGATSTON SCORE: 8  LM: 0       LAD: 8       LCX: 0       RCA: 0  This places the patient in the JENSEN 75% risk percentile for age and gender  matched individuals.    CORONARY ARTERIES    LEFT MAIN: Patent.    LAD: Focal calcified plaque at the ostium causing minimal (less than 10%)  stenosis.  The remainder of the left anterior descending appears patent.  The  left anterior descending gives rise to one large diagonal branch which appears  patent.    RAMUS: Small to medium sized vessel.  Patent.    LCX: Small nondominant vessel.  Patent.    RCA: Large dominant vessel which gives rise to the RPDA.  Patent.    PLAQUE BURDEN:   Mild    CARDIAC MORPHOLOGY    LEFT VENTRICLE: Normal cavity size with normal wall thickness.  LEFT ATRIUM: Normal.  LEFT ATRIAL APPENDAGE: No evidence of thrombus.  RIGHT VENTRICLE:  Normal size.  RIGHT ATRIUM: Normal.  AORTIC VALVE: Normal trileaflet aortic valve.  MITRAL VALVE: Normal.  TRICUSPID VALVE: Grossly normal.  PULMONIC VALVE: Grossly normal.  PERICARDIUM: Normal.    AORTA    AORTIC ROOT: Normal.  SINUS OF VALSALVA: Normal.  SINOTUBULAR JUNCTION: Normal.  ASCENDING THORACIC AORTA: Normal.    SUMMARY:  1. Mild nonobstructive single vessel coronary artery disease with focal,  calcified plaque at the ostium of the left anterior descending causing minimal  stenosis.  2. Normal cardiac structures.  3. Coronary calcium score 8. This places the patient in the JENSEN 75% risk  percentile for age and gender matched  individuals.  4. Overall quality of the scan was good.    Cardiac interpretation by Michael Valentino, M.D., PhD on 1/23/2024 3:29 PM.  ________________________________________________________________________________  _  The cardiac CT evaluation is a focus examination and does not include the entire  extent of structures listed below:    CT DOSE: One or more dose reduction techniques (e.g. automated exposure control,  adjustment of the mA and/or kV according to patient size, use of iterative  reconstruction technique) utilization for this examination.    3) NON- CARDIAC FINDINGS:    AORTA: Unremarkable.  PULMONARY VEINS: Unremarkable.  PULMONARY ARTERIES: Unremarkable.  SVC: Unremarkable.  IVC: Unremarkable.  LUNG GRANT: Bibasilar atelectasis is noted.  LYMPH NODES: No lymphadenopathy.  OSSEOUS STRUCTURES: Unremarkable.  OTHER: Unremarkable.    Impression  IMPRESSION:  1. See above cardiologist summary.  2. Noncardiac portions of the examination were unremarkable.    CT Chest interpretation by Alonso Lomeli MD on 1/23/2024        Past Medical History:   Diagnosis Date    Benign insulinoma     High cholesterol     Umbilical hernia     Vitamin D deficiency         Past Surgical History   Procedure Laterality Date    Pancreas surgery  1990    insulinoma        Family History   Problem Relation Name Age of Onset    Hyperlipidemia Biological Mother      Hyperlipidemia Biological Father      Parkinsonism Biological Father      Dementia Biological Father      Lymphoma Biological Sister      Lymphoma Biological Brother          Social History     Socioeconomic History    Marital status:      Spouse name: None    Number of children: None    Years of education: None    Highest education level: None   Tobacco Use    Smoking status: Never    Smokeless tobacco: Never   Substance and Sexual Activity    Alcohol use: Yes     Alcohol/week: 14.0 standard drinks of alcohol     Types: 14 Glasses of wine per week    Drug use:  Never   Social History Narrative    Do you wear your seatbelt? yes    Do you have smoke detector in your home? yes    Do you have a carbon monoxide detector in your home? 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         Social Drivers of Health     Food Insecurity: No Food Insecurity (10/17/2023)    Hunger Vital Sign     Worried About Running Out of Food in the Last Year: Never true     Ran Out of Food in the Last Year: Never true        Current Outpatient Medications   Medication Sig Dispense Refill    aspirin 81 mg enteric coated tablet Take 1 tablet (81 mg total) by mouth daily. 90 tablet 3    cholecalciferol, vitamin D3, 10 mcg (400 unit) capsule Vitamin D3 400 unit capsule   Take by oral route.      clotrimazole-betamethasone (LOTRISONE) 1-0.05 % cream APPLY TO AFFECTED AREA TWICE A DAY (Patient taking differently: as needed. APPLY TO AFFECTED AREA TWICE A DAY) 45 g 1    rosuvastatin (CRESTOR) 20 mg tablet TAKE 1 TABLET BY MOUTH EVERY DAY 90 tablet 3    scopolamine (TRANSDERM-SCOP) 1 mg over 3 days transdermal patch PLACE 1 PATCH ON THE SKIN EVERY 3 DAYS. (Patient taking differently: as needed. PLACE 1 PATCH ON THE SKIN EVERY 3 DAYS.) 24 patch 1    traZODone (DESYREL) 50 mg tablet TAKE 1 TABLET BY MOUTH EVERY DAY AT BEDTIME AS NEEDED FOR SLEEP 30 tablet 0    vit A,C and E-lutein-minerals 300 mcg-200 mg-27 mg-2 mg tablet Take 1 tablet by mouth daily.      meloxicam (MOBIC) 15 mg tablet TAKE 1 TABLET (15 MG TOTAL) BY MOUTH DAILY. 30 tablet 0    omeprazole (PriLOSEC) 20 mg capsule Take 20 mg by mouth daily before breakfast. (Patient not taking: Reported on 3/6/2025)      semaglutide 3 mg tablet Take 1 tablet (3 mg total) by mouth daily. 90 tablet 0    tiZANidine (ZANAFLEX) 2 mg tablet Take 1 tablet (2 mg total) by mouth every 8 (eight) hours as needed for muscle spasms. 90 tablet 0     No current facility-administered medications for this visit.       "  Allergies:  Sulfamethoxazole-trimethoprim       Review of Systems   Constitutional:  Negative for diaphoresis and fatigue.   HENT:  Negative for nosebleeds.    Respiratory:  Negative for shortness of breath and stridor.    Cardiovascular:  Negative for chest pain and palpitations.   Genitourinary:  Negative for hematuria.   Skin:  Negative for color change.   Neurological:  Negative for syncope and light-headedness.        Vitals:    03/06/25 0837   BP: 124/76   BP Location: Left upper arm   Patient Position: Sitting   Pulse: 74   SpO2: 98%   Weight: 82.6 kg (182 lb)   Height: 1.6 m (5' 3\")     Body mass index is 32.24 kg/m².     Physical Exam  Constitutional:       General: She is not in acute distress.     Appearance: Normal appearance. She is not ill-appearing.   HENT:      Head: Normocephalic and atraumatic.      Nose: Nose normal.   Eyes:      General:         Right eye: No discharge.         Left eye: No discharge.      Conjunctiva/sclera: Conjunctivae normal.   Neck:      Vascular: No carotid bruit.   Cardiovascular:      Rate and Rhythm: Normal rate and regular rhythm.      Heart sounds: No murmur heard.     No friction rub. No gallop.   Pulmonary:      Effort: No respiratory distress.      Breath sounds: No stridor. No wheezing, rhonchi or rales.   Musculoskeletal:      Right lower leg: No edema.      Left lower leg: No edema.   Skin:     General: Skin is warm and dry.   Neurological:      Mental Status: She is alert.   Psychiatric:         Mood and Affect: Mood normal.         Behavior: Behavior normal.             Diagnosis Plan   1. Coronary artery calcification  [facility] ECG 12 lead (clinic performed)    Echocardiogram stress test - exercise (stress echo)      2. Mixed hyperlipidemia            ASSESSMENT AND PLAN:     59 y.o. female with a history of coronary artery calcifications (CAC 8) and HLD who presents for cardiovascular evaluation.     # Coronary artery calcifications (CAC 8)  Stable, " free of anginal symptomatology.  Stress echocardiogram pending  Continue rosuvastatin 20 mg daily     # HLD  Statin as above  LDL well-controlled        Return in about 6 months (around 9/6/2025).     I thank you for the opportunity to take part in the care of Ching Gaytan.  Please do not hesitate to contact me with any questions or concerns.     Sincerely,  Jerad Clements MD  Cardiovascular Disease  3/6/2025      This note was generated using speech recognition software. Please excuse any typographical errors. Please contact me with any questions or concerns.

## 2025-03-06 NOTE — TELEPHONE ENCOUNTER
Pre-cert request for:    Test: Echo Stress  NPI: [id number]   Provider: Dr. Clements  Schedule:  4/3/2025  Location: [place]      Thank you!

## 2025-04-02 DIAGNOSIS — F51.01 PRIMARY INSOMNIA: ICD-10-CM

## 2025-04-02 RX ORDER — TRAZODONE HYDROCHLORIDE 50 MG/1
50 TABLET ORAL NIGHTLY PRN
Qty: 30 TABLET | Refills: 2 | Status: SHIPPED | OUTPATIENT
Start: 2025-04-02 | End: 2025-04-30 | Stop reason: SDUPTHER

## 2025-04-30 DIAGNOSIS — F51.01 PRIMARY INSOMNIA: ICD-10-CM

## 2025-05-01 RX ORDER — TRAZODONE HYDROCHLORIDE 50 MG/1
50 TABLET ORAL NIGHTLY PRN
Qty: 90 TABLET | Refills: 0 | Status: SHIPPED | OUTPATIENT
Start: 2025-05-01 | End: 2025-06-02 | Stop reason: SDUPTHER

## 2025-05-28 ENCOUNTER — APPOINTMENT (OUTPATIENT)
Dept: URBAN - METROPOLITAN AREA CLINIC 31 | Facility: CLINIC | Age: 60
Setting detail: DERMATOLOGY
End: 2025-05-28

## 2025-05-28 DIAGNOSIS — D22 MELANOCYTIC NEVI: ICD-10-CM

## 2025-05-28 DIAGNOSIS — D18.0 HEMANGIOMA: ICD-10-CM

## 2025-05-28 DIAGNOSIS — L81.4 OTHER MELANIN HYPERPIGMENTATION: ICD-10-CM

## 2025-05-28 DIAGNOSIS — L82.1 OTHER SEBORRHEIC KERATOSIS: ICD-10-CM

## 2025-05-28 PROBLEM — D48.5 NEOPLASM OF UNCERTAIN BEHAVIOR OF SKIN: Status: ACTIVE | Noted: 2025-05-28

## 2025-05-28 PROBLEM — D22.5 MELANOCYTIC NEVI OF TRUNK: Status: ACTIVE | Noted: 2025-05-28

## 2025-05-28 PROBLEM — D18.01 HEMANGIOMA OF SKIN AND SUBCUTANEOUS TISSUE: Status: ACTIVE | Noted: 2025-05-28

## 2025-05-28 PROCEDURE — 11102 TANGNTL BX SKIN SINGLE LES: CPT

## 2025-05-28 PROCEDURE — ? FULL BODY SKIN EXAM

## 2025-05-28 PROCEDURE — ? COUNSELING

## 2025-05-28 PROCEDURE — ? BIOPSY BY SHAVE METHOD

## 2025-05-28 PROCEDURE — ? TREATMENT REGIMEN

## 2025-05-28 PROCEDURE — 99213 OFFICE O/P EST LOW 20 MIN: CPT | Mod: 25

## 2025-05-28 ASSESSMENT — LOCATION ZONE DERM
LOCATION ZONE: TRUNK
LOCATION ZONE: FEET

## 2025-05-28 ASSESSMENT — LOCATION SIMPLE DESCRIPTION DERM
LOCATION SIMPLE: LEFT PLANTAR SURFACE
LOCATION SIMPLE: UPPER BACK

## 2025-05-29 ENCOUNTER — HOSPITAL ENCOUNTER (OUTPATIENT)
Dept: CARDIOLOGY | Facility: CLINIC | Age: 60
Discharge: HOME | End: 2025-05-29
Attending: STUDENT IN AN ORGANIZED HEALTH CARE EDUCATION/TRAINING PROGRAM
Payer: COMMERCIAL

## 2025-05-29 VITALS
SYSTOLIC BLOOD PRESSURE: 120 MMHG | BODY MASS INDEX: 32.43 KG/M2 | WEIGHT: 183 LBS | HEIGHT: 63 IN | DIASTOLIC BLOOD PRESSURE: 80 MMHG

## 2025-05-29 DIAGNOSIS — I25.10 CORONARY ARTERY CALCIFICATION: ICD-10-CM

## 2025-05-29 LAB
AORTIC ROOT ANNULUS: 3.3 CM
ASCENDING AORTA: 3.4 CM
AV PEAK GRADIENT: 7 MMHG
AV PEAK VELOCITY-S: 1.33 M/S
AV VALVE AREA: 1.82 CM2
BSA FOR ECHO PROCEDURE: 1.92 M2
DOP CALC LVOT STROKE VOLUME: 67.16 ML
DOP QS: 4998.08
E WAVE DECELERATION TIME: 206 MS
E/A RATIO: 0.7
E/E' RATIO: 9.2
E/LAT E' RATIO: 6.8
EDV (BP): 79.1 CM3
EF (A4C): 60.8 %
EF A2C: 66.3 %
EJECTION FRACTION: 61.4 %
ESV (BP): 30.5 CM3
LA ESV (BP): 43.2 CM3
LA ESV INDEX (A2C): 29.9 CM3/M2
LA ESV INDEX (BP): 22.5 CM3/M2
LA/AORTA RATIO: 1.09
LAAS-AP2: 19.6 CM2
LAAS-AP4: 14.7 CM2
LAD 2D: 3.6 CM
LAL MED-LAT (A4C): 5.36 CM
LAV-S: 57.4 CM3
LEFT ATRIAL LENGTH SUPERIOR-INFERIOR (APICAL 2-CHAMBER VIEW): 5.37 CM
LEFT ATRIUM VOLUME INDEX: 17.03 CM3/M2
LEFT ATRIUM VOLUME: 32.7 CM3
LEFT VENTRICLE DIASTOLIC VOLUME INDEX: 37.03 CM3/M2
LEFT VENTRICLE DIASTOLIC VOLUME: 71.1 CM3
LEFT VENTRICLE SYSTOLIC VOLUME INDEX: 14.53 CM3/M2
LEFT VENTRICLE SYSTOLIC VOLUME: 27.9 CM3
LV DIASTOLIC VOLUME: 82.8 CM3
LV ESV (APICAL 2 CHAMBER): 27.9 CM3
LVAD-AP2: 25.4 CM2
LVAD-AP4: 25.1 CM2
LVAS-AP2: 12.1 CM2
LVAS-AP4: 13.8 CM2
LVEDVI(A2C): 43.13 CM3/M2
LVEDVI(BP): 41.2 CM3/M2
LVESVI(A2C): 14.53 CM3/M2
LVESVI(BP): 15.89 CM3/M2
LVLD-AP2: 6.52 CM
LVLD-AP4: 7.07 CM
LVLS-AP2: 4.59 CM
LVLS-AP4: 5.6 CM
LVOT 2D: 1.9 CM
LVOT A: 2.84 CM2
LVOT MG: 2 MMHG
LVOT MV: 0.75 M/S
LVOT PEAK VELOCITY: 1.09 M/S
LVOT PG: 5 MMHG
LVOT STROKE VOLUME INDEX: 34.98 ML/M2
LVOT VTI: 23.7 CM
MLH CV ECHO AVA INDEX VELOCITY RATIO: 0.9
MV E'TISSUE VEL-LAT: 0.1 M/S
MV E'TISSUE VEL-MED: 0.07 M/S
MV PEAK A VEL: 0.98 M/S
MV PEAK E VEL: 0.68 M/S
MV STENOSIS PRESSURE HALF TIME: 60 MS
MV VALVE AREA P 1/2 METHOD: 3.67 CM2
PV PEAK GRADIENT: 9 MMHG
PV PV: 1.48 M/S
RVOT VMAX: 1.13 M/S
RVOT VTI: 25 CM
SEPTAL TISSUE DOPPLER FREE WALL LATE DIA VELOCITY (APICAL 4 CHAMBER VIEW): 0.12 M/S
STRESS ANGINA INDEX: 0
STRESS BASELINE BP: NORMAL MMHG
STRESS BASELINE HR: 76 BPM
STRESS DUKE TREADMILL SCORE: 0
STRESS ECHO POST RECOVERY HR: 117 BPM
STRESS O2 SAT REST: 99 %
STRESS PERCENT HR: 95 %
STRESS POST ESTIMATED WORKLOAD: 10.1 METS
STRESS POST EXERCISE DUR MIN: 7 MIN
STRESS POST EXERCISE DUR SEC: 4 SEC
STRESS POST PEAK BP: NORMAL MMHG
STRESS POST PEAK HR: 153 BPM
STRESS ST DEPRESSION: 0 MM
STRESS TARGET HR: 137 BPM
TR MAX PG: 32.49 MMHG
TRICUSPID VALVE PEAK REGURGITATION VELOCITY: 2.85 M/S

## 2025-05-29 PROCEDURE — 93351 STRESS TTE COMPLETE: CPT | Performed by: STUDENT IN AN ORGANIZED HEALTH CARE EDUCATION/TRAINING PROGRAM

## 2025-05-29 PROCEDURE — 93320 DOPPLER ECHO COMPLETE: CPT | Performed by: STUDENT IN AN ORGANIZED HEALTH CARE EDUCATION/TRAINING PROGRAM

## 2025-05-29 PROCEDURE — 93325 DOPPLER ECHO COLOR FLOW MAPG: CPT | Performed by: STUDENT IN AN ORGANIZED HEALTH CARE EDUCATION/TRAINING PROGRAM

## 2025-05-30 ENCOUNTER — RESULTS FOLLOW-UP (OUTPATIENT)
Dept: CARDIOLOGY | Facility: CLINIC | Age: 60
End: 2025-05-30
Payer: COMMERCIAL

## 2025-06-02 ENCOUNTER — OFFICE VISIT (OUTPATIENT)
Dept: FAMILY MEDICINE | Facility: CLINIC | Age: 60
End: 2025-06-02
Payer: COMMERCIAL

## 2025-06-02 VITALS
SYSTOLIC BLOOD PRESSURE: 126 MMHG | HEIGHT: 63 IN | WEIGHT: 183 LBS | OXYGEN SATURATION: 97 % | DIASTOLIC BLOOD PRESSURE: 80 MMHG | BODY MASS INDEX: 32.43 KG/M2 | TEMPERATURE: 98 F | HEART RATE: 72 BPM

## 2025-06-02 DIAGNOSIS — Z00.00 ENCOUNTER FOR GENERAL ADULT MEDICAL EXAMINATION WITHOUT ABNORMAL FINDINGS: Primary | ICD-10-CM

## 2025-06-02 DIAGNOSIS — S46.912A MUSCLE STRAIN OF LEFT UPPER ARM, INITIAL ENCOUNTER: ICD-10-CM

## 2025-06-02 DIAGNOSIS — F51.01 PRIMARY INSOMNIA: ICD-10-CM

## 2025-06-02 PROCEDURE — 99396 PREV VISIT EST AGE 40-64: CPT | Performed by: FAMILY MEDICINE

## 2025-06-02 PROCEDURE — 3008F BODY MASS INDEX DOCD: CPT | Performed by: FAMILY MEDICINE

## 2025-06-02 RX ORDER — TRAZODONE HYDROCHLORIDE 50 MG/1
50 TABLET ORAL NIGHTLY PRN
Qty: 90 TABLET | Refills: 3 | Status: SHIPPED | OUTPATIENT
Start: 2025-06-02

## 2025-06-02 ASSESSMENT — PATIENT HEALTH QUESTIONNAIRE - PHQ9: SUM OF ALL RESPONSES TO PHQ9 QUESTIONS 1 & 2: 0

## 2025-06-02 NOTE — PROGRESS NOTES
"HPI:  Ching Gaytna is an 59 y.o. female presenting for annual well visit.    Mammogram in March - recommended US with repeat in 6 months  Recent stress test - normal  Mole left foot - dermatology - biopsy done - pathology pending.    Left arm soreness - cares for granddaughter 1 1/2 years old with new baby due soon.    Insomnia - benefits from Trazodone.      Allergies:  Sulfamethoxazole-trimethoprim    Past Medical History:   Diagnosis Date    Benign insulinoma     High cholesterol     Umbilical hernia     Vitamin D deficiency        Past Surgical History   Procedure Laterality Date    Pancreas surgery  1990    insulinoma       Social History     Tobacco Use    Smoking status: Never    Smokeless tobacco: Never   Substance Use Topics    Alcohol use: Yes     Alcohol/week: 14.0 standard drinks of alcohol     Types: 14 Glasses of wine per week       Medications Ordered Prior to Encounter[1]    Family History   Problem Relation Name Age of Onset    Hyperlipidemia Biological Mother      Hyperlipidemia Biological Father      Parkinsonism Biological Father      Dementia Biological Father      Lymphoma Biological Sister      Lymphoma Biological Brother         Visit Vitals  /86 (BP Location: Right upper arm, Patient Position: Sitting)   Pulse 72   Temp 36.7 °C (98 °F) (Oral)   Ht 1.6 m (5' 3\")   Wt 83 kg (183 lb)   SpO2 97%   BMI 32.42 kg/m²        Physical Exam  Constitutional:       General: She is not in acute distress.     Appearance: Normal appearance. She is not ill-appearing.   HENT:      Right Ear: Tympanic membrane and ear canal normal.      Left Ear: Tympanic membrane and ear canal normal.      Nose: Nose normal.      Mouth/Throat:      Pharynx: Oropharynx is clear.   Eyes:      Extraocular Movements: Extraocular movements intact.      Pupils: Pupils are equal, round, and reactive to light.   Neck:      Thyroid: No thyromegaly.   Cardiovascular:      Rate and Rhythm: Normal rate and regular rhythm.    "   Pulses: Normal pulses.           Dorsalis pedis pulses are 2+ on the right side and 2+ on the left side.      Heart sounds: Normal heart sounds. No murmur heard.  Pulmonary:      Effort: Pulmonary effort is normal. No respiratory distress.      Breath sounds: Normal breath sounds. No wheezing, rhonchi or rales.   Abdominal:      General: Bowel sounds are normal.      Palpations: Abdomen is soft. There is no mass.      Tenderness: There is no abdominal tenderness. There is no guarding or rebound.   Musculoskeletal:         General: No swelling or tenderness. Normal range of motion.      Cervical back: Normal range of motion and neck supple.      Right lower leg: No edema.      Left lower leg: No edema.   Lymphadenopathy:      Cervical: No cervical adenopathy.   Skin:     Findings: No lesion or rash.   Neurological:      General: No focal deficit present.      Mental Status: She is alert.      Deep Tendon Reflexes: Reflexes normal.   Psychiatric:         Mood and Affect: Mood normal.           A/P  1. Encounter for general adult medical examination without abnormal findings (Primary)  Labs UTD    2. Primary insomnia  Benefits from Trazodone  - traZODone (DESYREL) 50 mg tablet; Take 1 tablet (50 mg total) by mouth nightly as needed for sleep. for sleep  Dispense: 90 tablet; Refill: 3    3. Muscle strain of left upper arm, initial encounter  Upper and lower arm  Handout HEP     Recommend PCV 20 - she will get this in the fall with flu vaccine at [facility].  Colonoscopy 2022 - repeat 5 years - printed/to scan      Next appointment recommended:  1 year/prn      The patient (parent) indicates an understanding of the events of today's medical evaluation and agrees to the plan of care.    I have asked the patient (parent) to be on the alert for new or worsening symptoms and to call the office directly or proceed to the nearest ER if such should occur.             [1]   Current Outpatient Medications on File Prior to Visit    Medication Sig Dispense Refill    aspirin 81 mg enteric coated tablet Take 1 tablet (81 mg total) by mouth daily. 90 tablet 3    cholecalciferol, vitamin D3, 10 mcg (400 unit) capsule Vitamin D3 400 unit capsule   Take by oral route.      clotrimazole-betamethasone (LOTRISONE) 1-0.05 % cream APPLY TO AFFECTED AREA TWICE A DAY (Patient taking differently: as needed. APPLY TO AFFECTED AREA TWICE A DAY) 45 g 1    omeprazole (PriLOSEC) 20 mg capsule Take 20 mg by mouth daily before breakfast.      rosuvastatin (CRESTOR) 20 mg tablet TAKE 1 TABLET BY MOUTH EVERY DAY 90 tablet 3    scopolamine (TRANSDERM-SCOP) 1 mg over 3 days transdermal patch PLACE 1 PATCH ON THE SKIN EVERY 3 DAYS. (Patient taking differently: as needed. PLACE 1 PATCH ON THE SKIN EVERY 3 DAYS.) 24 patch 1    semaglutide 3 mg tablet Take 1 tablet (3 mg total) by mouth daily. 90 tablet 0    tiZANidine (ZANAFLEX) 2 mg tablet Take 1 tablet (2 mg total) by mouth every 8 (eight) hours as needed for muscle spasms. 90 tablet 0    traZODone (DESYREL) 50 mg tablet Take 1 tablet (50 mg total) by mouth nightly as needed for sleep. for sleep 90 tablet 0    vit A,C and E-lutein-minerals 300 mcg-200 mg-27 mg-2 mg tablet Take 1 tablet by mouth daily.      meloxicam (MOBIC) 15 mg tablet TAKE 1 TABLET (15 MG TOTAL) BY MOUTH DAILY. 30 tablet 0     No current facility-administered medications on file prior to visit.

## 2025-07-31 NOTE — DISCHARGE INSTRUCTIONS
Avoid caffeine, alcohol, over-the-counter decongestants  Drink plenty fluids    Return to the emergency department for worsening of symptoms or if you develop any new concerning symptoms including difficulty breathing, dizziness, fainting, persistent vomiting, extremity numbness or weakness.    Follow up with your family doctor within 48 hours for re-evaluation and further treatment and monitoring.      calm

## 2025-09-02 DIAGNOSIS — I25.10 CORONARY ARTERY CALCIFICATION: ICD-10-CM

## 2025-09-02 DIAGNOSIS — E78.00 PURE HYPERCHOLESTEROLEMIA: ICD-10-CM

## 2025-09-02 RX ORDER — ROSUVASTATIN CALCIUM 20 MG/1
20 TABLET, COATED ORAL DAILY
Qty: 90 TABLET | Refills: 3 | Status: SHIPPED | OUTPATIENT
Start: 2025-09-02